# Patient Record
Sex: FEMALE | Race: WHITE | NOT HISPANIC OR LATINO | Employment: FULL TIME | URBAN - METROPOLITAN AREA
[De-identification: names, ages, dates, MRNs, and addresses within clinical notes are randomized per-mention and may not be internally consistent; named-entity substitution may affect disease eponyms.]

---

## 2017-06-02 ENCOUNTER — OFFICE VISIT (OUTPATIENT)
Dept: FAMILY MEDICINE | Facility: CLINIC | Age: 42
End: 2017-06-02
Payer: COMMERCIAL

## 2017-06-02 VITALS
BODY MASS INDEX: 18.19 KG/M2 | DIASTOLIC BLOOD PRESSURE: 63 MMHG | SYSTOLIC BLOOD PRESSURE: 99 MMHG | TEMPERATURE: 97.4 F | HEART RATE: 97 BPM | WEIGHT: 106 LBS | OXYGEN SATURATION: 100 %

## 2017-06-02 DIAGNOSIS — J01.90 ACUTE RHINOSINUSITIS: Primary | ICD-10-CM

## 2017-06-02 PROCEDURE — 99213 OFFICE O/P EST LOW 20 MIN: CPT | Performed by: PHYSICIAN ASSISTANT

## 2017-06-02 RX ORDER — FLUTICASONE PROPIONATE 50 MCG
2 SPRAY, SUSPENSION (ML) NASAL DAILY
Qty: 16 G | Refills: 0 | Status: SHIPPED | OUTPATIENT
Start: 2017-06-02 | End: 2017-10-04

## 2017-06-02 NOTE — PROGRESS NOTES
SUBJECTIVE:                                                    Dontae Weston is a 42 year old female who presents to clinic today for the following health issues:      ENT Symptoms             Symptoms: cc Present Absent Comment   Fever/Chills   x    Fatigue  x     Muscle Aches  x     Eye Irritation  x  Due to sinus pressure, no drainage, no erythema    Sneezing   x    Nasal Charbel/Drg  x     Sinus Pressure/Pain  x  Frontal and maxillary    Loss of smell  x     Dental pain  x  Maxillary teeth hurt from sinus pressure   Sore Throat   x    Swollen Glands   x    Ear Pain/Fullness   x    Cough  x  Productive with phlegm in the mornings   Wheeze   x    Chest Pain   x    Shortness of breath   x    Rash   x    Other         Symptom duration:  3 days ago   Symptom severity:  severe - worse today   Treatments tried:  otc, mucinex, benadryl, cough drops vicks   Contacts:  her daughter             Problem list and histories reviewed & adjusted, as indicated.  Additional history: as documented    Patient Active Problem List   Diagnosis     CARDIOVASCULAR SCREENING; LDL GOAL LESS THAN 160     Chemical dependency (H)     Past Surgical History:   Procedure Laterality Date     CRYOCAUTERY OF CERVIX  1997       Social History   Substance Use Topics     Smoking status: Current Some Day Smoker     Packs/day: 0.08     Types: Cigarettes     Smokeless tobacco: Never Used      Comment: 5 cigs daily     Alcohol use No     Family History   Problem Relation Age of Onset     Unknown/Adopted Maternal Grandmother      Alzheimer Disease Maternal Grandfather      HEART DISEASE Paternal Grandmother      CANCER Paternal Grandfather      brain tumor         Current Outpatient Prescriptions   Medication Sig Dispense Refill     fluticasone (FLONASE) 50 MCG/ACT spray Spray 2 sprays into both nostrils daily 16 g 0     No Known Allergies  BP Readings from Last 3 Encounters:   06/02/17 99/63   11/30/15 108/73   11/09/15 114/74    Wt Readings from Last  3 Encounters:   06/02/17 106 lb (48.1 kg)   11/30/15 105 lb 12.8 oz (48 kg)   11/09/15 107 lb (48.5 kg)                    Reviewed and updated as needed this visit by clinical staff  Tobacco  Allergies  Meds  Med Hx  Surg Hx  Fam Hx  Soc Hx      Reviewed and updated as needed this visit by Provider         ROS:  Constitutional, HEENT, cardiovascular, pulmonary, and integumentary systems are negative, except as otherwise noted.    OBJECTIVE:                                                    BP 99/63  Pulse 97  Temp 97.4  F (36.3  C) (Oral)  Wt 106 lb (48.1 kg)  SpO2 100%  BMI 18.19 kg/m2  Body mass index is 18.19 kg/(m^2).  GENERAL: healthy, alert and no distress  EYES: Eyes grossly normal to inspection  HENT: normal cephalic/atraumatic, ear canals and TM's normal, nose and mouth without ulcers or lesions, rhinorrhea clear and yellow, oropharynx clear, oral mucous membranes moist and sinuses: maxillary, frontal tenderness on both sides  NECK: no adenopathy, no asymmetry, masses, or scars and thyroid normal to palpation  RESP: lungs clear to auscultation - no rales, rhonchi or wheezes  CV: regular rate and rhythm, normal S1 S2, no S3 or S4, no murmur, click or rub, no peripheral edema and peripheral pulses strong  MS: no gross musculoskeletal defects noted, no edema  SKIN: no suspicious lesions or rashes    Diagnostic Test Results:  none      ASSESSMENT/PLAN:                                                        ICD-10-CM    1. Acute rhinosinusitis J01.90 fluticasone (FLONASE) 50 MCG/ACT spray       Patient Instructions   Rest and increase fluids.    Sleep with head of bed elevated at night. Have a humidifier running at night    Alternate motrin and tylenol as needed for fever and discomfort.   Motrin (ibuprofen) 200mg over the counter tablets - 3 tablets every 6 hours as needed.   Tylenol (acetaminophen) 325mg over the counter tablets - 2 tablets every 4-6 hours as needed.     Use the flonase as  prescribed.    Recommend using Afrin as directed over the counter for no more than 3 days. This will help to decrease nasal congestion.    Perform nasal saline rinses to help decrease nasal congestion or breath in steam multiple times daily.     May take an over the counter antihistamine (claritin or zyrtec) daily to help decrease production of nasal secretions.    Please call the clinic if your symptoms are not showing improvement by Tuesday. I will reassess your symptoms at that time and see if further treatment is necessary.     Return sooner if any worsening of symptoms.           Irasema Powell PA-C  [facility]

## 2017-06-02 NOTE — MR AVS SNAPSHOT
After Visit Summary   6/2/2017    Dontae Weston    MRN: [id number]           Patient Information     Date Of Birth          1975        Visit Information        Provider Department      6/2/2017 1:20 PM Irasema Powell PA-C [facility]        Today's Diagnoses     Acute rhinosinusitis    -  1      Care Instructions    Rest and increase fluids.    Sleep with head of bed elevated at night. Have a humidifier running at night    Alternate motrin and tylenol as needed for fever and discomfort.   Motrin (ibuprofen) 200mg over the counter tablets - 3 tablets every 6 hours as needed.   Tylenol (acetaminophen) 325mg over the counter tablets - 2 tablets every 4-6 hours as needed.     Use the flonase as prescribed.    Recommend using Afrin as directed over the counter for no more than 3 days. This will help to decrease nasal congestion.    Perform nasal saline rinses to help decrease nasal congestion or breath in steam multiple times daily.     May take an over the counter antihistamine (claritin or zyrtec) daily to help decrease production of nasal secretions.    Please call the clinic if your symptoms are not showing improvement by Tuesday. I will reassess your symptoms at that time and see if further treatment is necessary.     Return sooner if any worsening of symptoms.               Follow-ups after your visit        Who to contact     If you have questions or need follow up information about today's clinic visit or your schedule please contact [facility] directly at [phone].  Normal or non-critical lab and imaging results will be communicated to you by MyChart, letter or phone within 4 business days after the clinic has received the results. If you do not hear from us within 7 days, please contact the clinic through MyChart or phone. If you have a critical or abnormal lab result, we will notify you by phone as soon as possible.  Submit refill requests through  [employer] or call your pharmacy and they will forward the refill request to us. Please allow 3 business days for your refill to be completed.          Additional Information About Your Visit        ybuyhart Information     [employer] gives you secure access to your electronic health record. If you see a primary care provider, you can also send messages to your care team and make appointments. If you have questions, please call your primary care clinic.  If you do not have a primary care provider, please call [phone] and they will assist you.        Care EveryWhere ID     This is your Care EveryWhere ID. This could be used by other organizations to access your [facility] medical records  [id number]        Your Vitals Were     Pulse Temperature Pulse Oximetry BMI (Body Mass Index)          97 97.4  F (36.3  C) (Oral) 100% 18.19 kg/m2         Blood Pressure from Last 3 Encounters:   06/02/17 99/63   11/30/15 108/73   11/09/15 114/74    Weight from Last 3 Encounters:   06/02/17 106 lb (48.1 kg)   11/30/15 105 lb 12.8 oz (48 kg)   11/09/15 107 lb (48.5 kg)              Today, you had the following     No orders found for display         Today's Medication Changes          These changes are accurate as of: 6/2/17  1:40 PM.  If you have any questions, ask your nurse or doctor.               Start taking these medicines.        Dose/Directions    fluticasone 50 MCG/ACT spray   Commonly known as:  FLONASE   Used for:  Acute rhinosinusitis   Started by:  Irasema Powell PA-C        Dose:  2 spray   Spray 2 sprays into both nostrils daily   Quantity:  16 g   Refills:  0         Stop taking these medicines if you haven't already. Please contact your care team if you have questions.     levonorgestrel-ethinyl estradiol 0.1-20 MG-MCG per tablet   Commonly known as:  LIZBET ENRIQUEZ LESSINA   Stopped by:  Irasema Powell PA-C                Where to get your medicines      These medications were sent to [facility] [place] - [address] [address], [address]  [address] [address], [address], [facility] [address]     Phone:  [phone]     fluticasone 50 MCG/ACT spray                Primary Care Provider Office Phone # Fax #    Junior Tk Crane [phone]       [facility] [address] [address] [address]        Thank you!     Thank you for choosing [facility]  for your care. Our goal is always to provide you with excellent care. Hearing back from our patients is one way we can continue to improve our services. Please take a few minutes to complete the written survey that you may receive in the mail after your visit with us. Thank you!             Your Updated Medication List - Protect others around you: Learn how to safely use, store and throw away your medicines at www.disposemymeds.org.          This list is accurate as of: 6/2/17  1:40 PM.  Always use your most recent med list.                   Brand Name Dispense Instructions for use    fluticasone 50 MCG/ACT spray    FLONASE    16 g    Spray 2 sprays into both nostrils daily

## 2017-06-02 NOTE — NURSING NOTE
"Chief Complaint   Patient presents with     Sinus Problem       Initial BP 99/63  Pulse 97  Temp 97.4  F (36.3  C) (Oral)  Wt 106 lb (48.1 kg)  SpO2 100%  BMI 18.19 kg/m2 Estimated body mass index is 18.19 kg/(m^2) as calculated from the following:    Height as of 11/30/15: 5' 4\" (1.626 m).    Weight as of this encounter: 106 lb (48.1 kg).  Medication Reconciliation: complete  "

## 2017-06-02 NOTE — PATIENT INSTRUCTIONS
Rest and increase fluids.    Sleep with head of bed elevated at night. Have a humidifier running at night    Alternate motrin and tylenol as needed for fever and discomfort.   Motrin (ibuprofen) 200mg over the counter tablets - 3 tablets every 6 hours as needed.   Tylenol (acetaminophen) 325mg over the counter tablets - 2 tablets every 4-6 hours as needed.     Use the flonase as prescribed.    Recommend using Afrin as directed over the counter for no more than 3 days. This will help to decrease nasal congestion.    Perform nasal saline rinses to help decrease nasal congestion or breath in steam multiple times daily.     May take an over the counter antihistamine (claritin or zyrtec) daily to help decrease production of nasal secretions.    Please call the clinic if your symptoms are not showing improvement by Tuesday. I will reassess your symptoms at that time and see if further treatment is necessary.     Return sooner if any worsening of symptoms.

## 2017-06-06 ENCOUNTER — TELEPHONE (OUTPATIENT)
Dept: FAMILY MEDICINE | Facility: CLINIC | Age: 42
End: 2017-06-06

## 2017-06-06 DIAGNOSIS — J01.90 ACUTE SINUSITIS, RECURRENCE NOT SPECIFIED, UNSPECIFIED LOCATION: Primary | ICD-10-CM

## 2017-06-06 NOTE — TELEPHONE ENCOUNTER
Calling to reports she is still having symptoms and not getting better. Sinus pain behind eyes, head and teeth. Very uncomfortable. Sinus drainage is green. Mucinex helps it drain a little. Compared to last week sinuses are draining but feels same pressure is not any better. Feels some relief during day when taking decongestent but feels horrible when it wears off. Went to get flonase and it cost too much and so is using the saline sprays. If antibiotic is ordered she says the z-pac has helped in the past and she would prefer that to being on antibiotic for several weeks. To provider to advise.  Lolly Padilla RN

## 2017-06-06 NOTE — TELEPHONE ENCOUNTER
Patient calling to let Irasema HERNDON know she is not getting any better from when she was in on Friday to see her. Please call to advise. Thank you

## 2017-06-07 RX ORDER — AZITHROMYCIN 250 MG/1
TABLET, FILM COATED ORAL
Qty: 6 TABLET | Refills: 0 | Status: SHIPPED | OUTPATIENT
Start: 2017-06-07 | End: 2017-10-04

## 2017-07-08 ENCOUNTER — HEALTH MAINTENANCE LETTER (OUTPATIENT)
Age: 42
End: 2017-07-08

## 2017-10-02 NOTE — PATIENT INSTRUCTIONS
Please call to schedule your fasting lab appointment to have your cholesterol checked and to be screened for diabetes.    Call your insurance to find out if screening mammograms are covered. If they are, please call the [facility] to schedule your mammogram.    Return in 1 year for your annual physical.     Preventive Health Recommendations  Female Ages 40 to 49    Yearly exam:     See your health care provider every year in order to  1. Review health changes.   2. Discuss preventive care.    3. Review your medicines if your doctor prescribed any.      Get a Pap test every three years (unless you have an abnormal result and your provider advises testing more often).      If you get Pap tests with HPV test, you only need to test every 5 years, unless you have an abnormal result. You do not need a Pap test if your uterus was removed (hysterectomy) and you have not had cancer.      You should be tested each year for STDs (sexually transmitted diseases), if you're at risk.       Ask your doctor if you should have a mammogram.      Have a colonoscopy (test for colon cancer) if someone in your family has had colon cancer or polyps before age 50.       Have a cholesterol test every 5 years.       Have a diabetes test (fasting glucose) after age 45. If you are at risk for diabetes, you should have this test every 3 years.    Shots: Get a flu shot each year. Get a tetanus shot every 10 years.     Nutrition:     Eat at least 5 servings of fruits and vegetables each day.    Eat whole-grain bread, whole-wheat pasta and brown rice instead of white grains and rice.    Talk to your provider about Calcium and Vitamin D.     Lifestyle    Exercise at least 150 minutes a week (an average of 30 minutes a day, 5 days a week). This will help you control your weight and prevent disease.    Limit alcohol to one drink per day.    No smoking.     Wear sunscreen to prevent skin cancer.    See your dentist every six months for an exam and  cleaning.

## 2017-10-04 ENCOUNTER — OFFICE VISIT (OUTPATIENT)
Dept: FAMILY MEDICINE | Facility: CLINIC | Age: 42
End: 2017-10-04
Payer: COMMERCIAL

## 2017-10-04 VITALS
OXYGEN SATURATION: 100 % | WEIGHT: 106 LBS | SYSTOLIC BLOOD PRESSURE: 107 MMHG | BODY MASS INDEX: 18.78 KG/M2 | HEART RATE: 86 BPM | TEMPERATURE: 97.5 F | HEIGHT: 63 IN | DIASTOLIC BLOOD PRESSURE: 70 MMHG

## 2017-10-04 DIAGNOSIS — Z23 NEED FOR VACCINATION: ICD-10-CM

## 2017-10-04 DIAGNOSIS — Z13.1 SCREENING FOR DIABETES MELLITUS: ICD-10-CM

## 2017-10-04 DIAGNOSIS — Z13.6 CARDIOVASCULAR SCREENING; LDL GOAL LESS THAN 160: ICD-10-CM

## 2017-10-04 DIAGNOSIS — Z00.00 ENCOUNTER FOR ROUTINE ADULT HEALTH EXAMINATION WITHOUT ABNORMAL FINDINGS: Primary | ICD-10-CM

## 2017-10-04 DIAGNOSIS — Z12.31 ENCOUNTER FOR SCREENING MAMMOGRAM FOR BREAST CANCER: ICD-10-CM

## 2017-10-04 DIAGNOSIS — Z12.4 PAP SMEAR FOR CERVICAL CANCER SCREENING: ICD-10-CM

## 2017-10-04 PROCEDURE — 99396 PREV VISIT EST AGE 40-64: CPT | Mod: 25 | Performed by: PHYSICIAN ASSISTANT

## 2017-10-04 PROCEDURE — 90715 TDAP VACCINE 7 YRS/> IM: CPT | Performed by: PHYSICIAN ASSISTANT

## 2017-10-04 PROCEDURE — 90471 IMMUNIZATION ADMIN: CPT | Performed by: PHYSICIAN ASSISTANT

## 2017-10-04 PROCEDURE — G0145 SCR C/V CYTO,THINLAYER,RESCR: HCPCS | Performed by: PHYSICIAN ASSISTANT

## 2017-10-04 PROCEDURE — 87624 HPV HI-RISK TYP POOLED RSLT: CPT | Performed by: PHYSICIAN ASSISTANT

## 2017-10-04 NOTE — NURSING NOTE
"Chief Complaint   Patient presents with     Physical       Initial /70  Pulse 86  Temp 97.5  F (36.4  C) (Oral)  Ht 5' 3\" (1.6 m)  Wt 106 lb (48.1 kg)  SpO2 100%  BMI 18.78 kg/m2 Estimated body mass index is 18.78 kg/(m^2) as calculated from the following:    Height as of this encounter: 5' 3\" (1.6 m).    Weight as of this encounter: 106 lb (48.1 kg).  Medication Reconciliation: complete  Angelina Gallardo M.A.    "

## 2017-10-04 NOTE — PROGRESS NOTES
SUBJECTIVE:   CC: Dontae Weston is an 42 year old woman who presents for preventive health visit.     Physical   Annual:     Getting at least 3 servings of Calcium per day::  Yes    Bi-annual eye exam::  NO    Dental care twice a year::  Yes    Sleep apnea or symptoms of sleep apnea::  None    Frequency of exercise::  2-3 days/week    Duration of exercise::  30-45 minutes    Taking medications regularly::  Not Applicable    Medication side effects::  Not applicable    Additional concerns today::  No    She will have her tdap today.     Answers for HPI/ROS submitted by the patient on 10/4/2017   PHQ-2 Score: 0        Today's PHQ-2 Score: PHQ-2 ( 1999 [employer]) 10/4/2017   Q1: Little interest or pleasure in doing things 0   Q2: Feeling down, depressed or hopeless 0   PHQ-2 Score 0   Q1: Little interest or pleasure in doing things Not at all   Q2: Feeling down, depressed or hopeless Not at all   PHQ-2 Score 0       Abuse: Current or Past(Physical, Sexual or Emotional)- No  Do you feel safe in your environment - Yes    Social History   Substance Use Topics     Smoking status: Current Some Day Smoker     Packs/day: 0.08     Types: Cigarettes     Smokeless tobacco: Never Used      Comment: 5 cigs daily     Alcohol use No     The patient does not drink >3 drinks per day nor >7 drinks per week.    Reviewed orders with patient.  Reviewed health maintenance and updated orders accordingly - Yes  BP Readings from Last 3 Encounters:   10/04/17 107/70   06/02/17 99/63   11/30/15 108/73    Wt Readings from Last 3 Encounters:   10/04/17 106 lb (48.1 kg)   06/02/17 106 lb (48.1 kg)   11/30/15 105 lb 12.8 oz (48 kg)                  Patient Active Problem List   Diagnosis     CARDIOVASCULAR SCREENING; LDL GOAL LESS THAN 160     Chemical dependency (H)     Past Surgical History:   Procedure Laterality Date     CRYOCAUTERY OF CERVIX  1997       Social History   Substance Use Topics     Smoking status: Current Some Day Smoker      "Packs/day: 0.08     Types: Cigarettes     Smokeless tobacco: Never Used      Comment: 5 cigs daily     Alcohol use No     Family History   Problem Relation Age of Onset     Unknown/Adopted Maternal Grandmother      Alzheimer Disease Maternal Grandfather      HEART DISEASE Paternal Grandmother      CANCER Paternal Grandfather      brain tumor         No current outpatient prescriptions on file.     No Known Allergies    Patient under age 50, mutual decision reflected in health maintenance.        Pertinent mammograms are reviewed under the imaging tab.  History of abnormal Pap smear: NO - age 30- 65 PAP every 5 years with HPV co-testing recommended    Reviewed and updated as needed this visit by clinical staff  Tobacco  Allergies  Meds  Med Hx  Surg Hx  Fam Hx  Soc Hx        Reviewed and updated as needed this visit by Provider              ROS:  C: NEGATIVE for fever, chills, change in weight  I: NEGATIVE for worrisome rashes, moles or lesions  E: NEGATIVE for vision changes or irritation  ENT: NEGATIVE for ear, mouth and throat problems  R: NEGATIVE for significant cough or SOB  B: NEGATIVE for masses, tenderness or discharge  CV: NEGATIVE for chest pain, palpitations or peripheral edema  GI: NEGATIVE for nausea, abdominal pain, heartburn, or change in bowel habits  : NEGATIVE for unusual urinary or vaginal symptoms. Periods are regular.  M: NEGATIVE for significant arthralgias or myalgia  N: NEGATIVE for weakness, dizziness or paresthesias  P: NEGATIVE for changes in mood or affect     OBJECTIVE:   /70  Pulse 86  Temp 97.5  F (36.4  C) (Oral)  Ht 5' 3\" (1.6 m)  Wt 106 lb (48.1 kg)  SpO2 100%  BMI 18.78 kg/m2  EXAM:  GENERAL: healthy, alert and no distress  EYES: Eyes grossly normal to inspection, PERRL and conjunctivae and sclerae normal  HENT: ear canals and TM's normal, nose and mouth without ulcers or lesions  NECK: no adenopathy, no asymmetry, masses, or scars and thyroid normal to " palpation  RESP: lungs clear to auscultation - no rales, rhonchi or wheezes  BREAST: normal without masses, tenderness or nipple discharge and no palpable axillary masses or adenopathy  CV: regular rate and rhythm, normal S1 S2, no S3 or S4, no murmur, click or rub, no peripheral edema and peripheral pulses strong  ABDOMEN: soft, nontender, no hepatosplenomegaly, no masses and bowel sounds normal   (female): normal female external genitalia, normal urethral meatus , vaginal mucosa pink, moist, well rugated and normal cervix, adnexae, and uterus without masses.  MS: no gross musculoskeletal defects noted, no edema  SKIN: no suspicious lesions or rashes  NEURO: Normal strength and tone, mentation intact and speech normal  PSYCH: mentation appears normal, affect normal/bright    ASSESSMENT/PLAN:       ICD-10-CM    1. Encounter for routine adult health examination without abnormal findings Z00.00    2. CARDIOVASCULAR SCREENING; LDL GOAL LESS THAN 160 Z13.6 Lipid panel reflex to direct LDL   3. Screening for diabetes mellitus Z13.1 Glucose   4. Pap smear for cervical cancer screening Z12.4 Pap imaged thin layer screen with HPV - recommended age 30 - 65 years (select HPV order below)     HPV High Risk Types DNA Cervical   5. Encounter for screening mammogram for breast cancer Z12.31 *MA Screening Digital Bilateral   6. Need for vaccination Z23 TDAP VACCINE ([facility]ACEL) [55087.002]     1st  Administration  [07561]       COUNSELING:  Reviewed preventive health counseling, as reflected in patient instructions       Regular exercise       Healthy diet/nutrition       Immunizations    Vaccinated for: TDAP               reports that she has been smoking Cigarettes.  She has been smoking about 0.08 packs per day. She has never used smokeless tobacco.  Tobacco Cessation Action Plan: Information offered: Patient not interested at this time  Estimated body mass index is 18.78 kg/(m^2) as calculated from the following:    Height as of  "this encounter: 5' 3\" (1.6 m).    Weight as of this encounter: 106 lb (48.1 kg).         Counseling Resources:  ATP IV Guidelines  Pooled Cohorts Equation Calculator  Breast Cancer Risk Calculator  FRAX Risk Assessment  ICSI Preventive Guidelines  Dietary Guidelines for Americans, 2010  USDA's MyPlate  ASA Prophylaxis  Lung CA Screening    Irasema Powell PA-C  [facility]  "

## 2017-10-04 NOTE — MR AVS SNAPSHOT
After Visit Summary   10/4/2017    Dontae Weston    MRN: [id number]           Patient Information     Date Of Birth          1975        Visit Information        Provider Department      10/4/2017 4:00 PM Irasema Powell PA-C [facility]        Today's Diagnoses     Encounter for routine adult health examination without abnormal findings    -  1    CARDIOVASCULAR SCREENING; LDL GOAL LESS THAN 160        Screening for diabetes mellitus        Pap smear for cervical cancer screening        Encounter for screening mammogram for breast cancer          Care Instructions    Please call to schedule your fasting lab appointment to have your cholesterol checked and to be screened for diabetes.    Call your insurance to find out if screening mammograms are covered. If they are, please call the [facility] to schedule your mammogram.    Return in 1 year for your annual physical.     Preventive Health Recommendations  Female Ages 40 to 49    Yearly exam:     See your health care provider every year in order to  1. Review health changes.   2. Discuss preventive care.    3. Review your medicines if your doctor prescribed any.      Get a Pap test every three years (unless you have an abnormal result and your provider advises testing more often).      If you get Pap tests with HPV test, you only need to test every 5 years, unless you have an abnormal result. You do not need a Pap test if your uterus was removed (hysterectomy) and you have not had cancer.      You should be tested each year for STDs (sexually transmitted diseases), if you're at risk.       Ask your doctor if you should have a mammogram.      Have a colonoscopy (test for colon cancer) if someone in your family has had colon cancer or polyps before age 50.       Have a cholesterol test every 5 years.       Have a diabetes test (fasting glucose) after age 45. If you are at risk for diabetes, you should have this test every  3 years.    Shots: Get a flu shot each year. Get a tetanus shot every 10 years.     Nutrition:     Eat at least 5 servings of fruits and vegetables each day.    Eat whole-grain bread, whole-wheat pasta and brown rice instead of white grains and rice.    Talk to your provider about Calcium and Vitamin D.     Lifestyle    Exercise at least 150 minutes a week (an average of 30 minutes a day, 5 days a week). This will help you control your weight and prevent disease.    Limit alcohol to one drink per day.    No smoking.     Wear sunscreen to prevent skin cancer.    See your dentist every six months for an exam and cleaning.          Follow-ups after your visit        Future tests that were ordered for you today     Open Future Orders        Priority Expected Expires Ordered    Lipid panel reflex to direct LDL Routine  4/7/2018 10/4/2017    Glucose Routine  10/4/2018 10/4/2017    *MA Screening Digital Bilateral Routine  10/4/2018 10/4/2017            Who to contact     If you have questions or need follow up information about today's clinic visit or your schedule please contact [facility] directly at [phone].  Normal or non-critical lab and imaging results will be communicated to you by J C Ladshart, letter or phone within 4 business days after the clinic has received the results. If you do not hear from us within 7 days, please contact the clinic through DDRdrivet or phone. If you have a critical or abnormal lab result, we will notify you by phone as soon as possible.  Submit refill requests through Phorest or call your pharmacy and they will forward the refill request to us. Please allow 3 business days for your refill to be completed.          Additional Information About Your Visit        Phorest Information     Phorest gives you secure access to your electronic health record. If you see a primary care provider, you can also send messages to your care team and make appointments. If you have questions,  "please call your primary care clinic.  If you do not have a primary care provider, please call [phone] and they will assist you.        Care EveryWhere ID     This is your Care EveryWhere ID. This could be used by other organizations to access your [place] medical records  [id number]        Your Vitals Were     Pulse Temperature Height Pulse Oximetry BMI (Body Mass Index)       86 97.5  F (36.4  C) (Oral) 5' 3\" (1.6 m) 100% 18.78 kg/m2        Blood Pressure from Last 3 Encounters:   10/04/17 107/70   06/02/17 99/63   11/30/15 108/73    Weight from Last 3 Encounters:   10/04/17 106 lb (48.1 kg)   06/02/17 106 lb (48.1 kg)   11/30/15 105 lb 12.8 oz (48 kg)              We Performed the Following     HPV High Risk Types DNA Cervical     Pap imaged thin layer screen with HPV - recommended age 30 - 65 years (select HPV order below)        Primary Care Provider Office Phone # Fax #    Junior Crane [phone] [address] [facility] [address]        Equal Access to Services     [facility] JACIDNA : Hadii aad ku hadasho Soomaali, waaxda luqadaha, qaybta kaalmada adeegyada, axel cardozan elias anthony. So [facility] [phone].    ATENCIÓN: Si habla español, tiene a kay disposición servicios gratuitos de asistencia lingüística. Llame al [phone].    We comply with applicable federal civil rights laws and [place] laws. We do not discriminate on the basis of race, color, national origin, age, disability, sex, sexual orientation, or gender identity.            Thank you!     Thank you for choosing [facility]  for your care. Our goal is always to provide you with excellent care. Hearing back from our patients is one way we can continue to improve our services. Please take a few minutes to complete the written survey that you may receive in the mail after your visit with us. Thank you!             Your Updated Medication List - Protect others around you: Learn " how to safely use, store and throw away your medicines at www.disposemymeds.org.      Notice  As of 10/4/2017  4:19 PM    You have not been prescribed any medications.

## 2017-10-04 NOTE — LETTER
October 12, 2017    Dontae Weston  [address] [place] [id number]    Dear Dontae,  We are happy to inform you that your PAP smear result from 10/4/17 is normal.  We are now able to do a follow up test on PAP smears. The DNA test is for HPV (Human Papilloma Virus). Cervical cancer is closely linked with certain types of HPV. Your result showed no evidence of HPV.  Therefore we recommend you return in 5 years for your next pap smear and HPV test.  You will still need to return to the clinic every year for an annual exam and other preventive tests.  Please contact the clinic at [phone] with any questions.  Sincerely,    Irasema Powell PA-C/anjel

## 2017-10-06 LAB
COPATH REPORT: NORMAL
PAP: NORMAL

## 2017-10-11 LAB
FINAL DIAGNOSIS: NORMAL
HPV HR 12 DNA CVX QL NAA+PROBE: NEGATIVE
HPV16 DNA SPEC QL NAA+PROBE: NEGATIVE
HPV18 DNA SPEC QL NAA+PROBE: NEGATIVE
SPECIMEN DESCRIPTION: NORMAL

## 2017-10-27 ENCOUNTER — TELEPHONE (OUTPATIENT)
Dept: FAMILY MEDICINE | Facility: CLINIC | Age: 42
End: 2017-10-27

## 2017-10-27 NOTE — TELEPHONE ENCOUNTER
Patient is calling stating needs a call back in regards to FMLA. Please call to discuss. Thank  You.

## 2017-11-01 NOTE — TELEPHONE ENCOUNTER
"Patient's daughter was ill last Tuesday and she had to miss work and her employer terminated her position the next day, was given a call and told not to come to work Wednesday, her boss said \"I need to think about this\" Patient states she was fired.    Patient is looking to have a note written to excuse her from work last Tuesday, states she has pictures of her daughter vomiting, unfortunately.     Patient did hire an . Please advise. Are you willing to see patient to see if there is anything you can do to help?    Patient last saw you on 11/30/16, most recently saw Irasema Powell on 10/4/17  "

## 2018-06-07 ENCOUNTER — OFFICE VISIT (OUTPATIENT)
Dept: FAMILY MEDICINE | Facility: CLINIC | Age: 43
End: 2018-06-07
Payer: COMMERCIAL

## 2018-06-07 VITALS
TEMPERATURE: 96.9 F | RESPIRATION RATE: 18 BRPM | HEART RATE: 94 BPM | WEIGHT: 104 LBS | DIASTOLIC BLOOD PRESSURE: 87 MMHG | BODY MASS INDEX: 18.42 KG/M2 | SYSTOLIC BLOOD PRESSURE: 130 MMHG | OXYGEN SATURATION: 99 %

## 2018-06-07 DIAGNOSIS — R10.0 ACUTE ABDOMEN: ICD-10-CM

## 2018-06-07 DIAGNOSIS — F19.20 CHEMICAL DEPENDENCY (H): Primary | ICD-10-CM

## 2018-06-07 PROCEDURE — 99214 OFFICE O/P EST MOD 30 MIN: CPT | Performed by: FAMILY MEDICINE

## 2018-06-07 ASSESSMENT — PAIN SCALES - GENERAL: PAINLEVEL: SEVERE PAIN (6)

## 2018-06-07 NOTE — PROGRESS NOTES
SUBJECTIVE:  43 year old.The patient has a history of abdominal pain and shoulder pain.  This started one day ago. Location right upper quadrant pain and left shoulder quality steady and increasing Associated symptoms are vomiting and diarrhea for 1-2 weeks.  Patient is a past alcoholic who is drinking again .  . ROS chills, no fever      Reviewed health maintenance  Patient Active Problem List   Diagnosis     CARDIOVASCULAR SCREENING; LDL GOAL LESS THAN 160     Chemical dependency (H)     Past Medical History:   Diagnosis Date     Abdominal pain, unspecified site      Chemical dependency (H)     alcohol; treated and sober since 2002     Dysplasia of cervix 1997    CRYO SURG     Panic attack        OBJECTIVE:  no apparent distress  /87  Pulse 94  Temp 96.9  F (36.1  C) (Oral)  Resp 18  Wt 104 lb (47.2 kg)  SpO2 99%  BMI 18.42 kg/m2    LUNGS:  CTA B/L, no wheezing or crackles.   Cardiovascular: negative, PMI normal. No lifts, heaves, or thrills. RRR. No murmurs, clicks gallops or rub   Gastrointestinal: Abdomen soft, non-tender. BS normal. No masses, organomegaly, positive findings: rigid abdomen with shoulder strap pain worse with palpation. positive rebound         ICD-10-CM    1. Chemical dependency (H) F19.20    2. Acute abdomen R10.0     PLAN: refuses ambulance  She is with sister and she will take her without stopping to [facility] ED.  Sister understand the severity of the problem.

## 2018-06-07 NOTE — MR AVS SNAPSHOT
After Visit Summary   6/7/2018    Dontae Weston    MRN: [id number]           Patient Information     Date Of Birth          1975        Visit Information        Provider Department      6/7/2018 10:15 AM Junior Crane MD [facility]        Today's Diagnoses     Chemical dependency (H)    -  1    Acute abdomen           Follow-ups after your visit        Who to contact     If you have questions or need follow up information about today's clinic visit or your schedule please contact [facility] directly at [phone].  Normal or non-critical lab and imaging results will be communicated to you by Cooliohart, letter or phone within 4 business days after the clinic has received the results. If you do not hear from us within 7 days, please contact the clinic through Zaelabt or phone. If you have a critical or abnormal lab result, we will notify you by phone as soon as possible.  Submit refill requests through Domain Media or call your pharmacy and they will forward the refill request to us. Please allow 3 business days for your refill to be completed.          Additional Information About Your Visit        MyChart Information     Domain Media gives you secure access to your electronic health record. If you see a primary care provider, you can also send messages to your care team and make appointments. If you have questions, please call your primary care clinic.  If you do not have a primary care provider, please call [phone] and they will assist you.        Care EveryWhere ID     This is your Care EveryWhere ID. This could be used by other organizations to access your [place] medical records  [id number]        Your Vitals Were     Pulse Temperature Respirations Pulse Oximetry BMI (Body Mass Index)       94 96.9  F (36.1  C) (Oral) 18 99% 18.42 kg/m2        Blood Pressure from Last 3 Encounters:   06/07/18 130/87   10/04/17 107/70   06/02/17 99/63    Weight from Last 3  Encounters:   06/07/18 104 lb (47.2 kg)   10/04/17 106 lb (48.1 kg)   06/02/17 106 lb (48.1 kg)              Today, you had the following     No orders found for display       Primary Care Provider Office Phone # Fax #    Junior Crane [phone] [address] [facility] [address]        Equal Access to Services     MARNI MONACO : Hadii aad ku hadasho Soomaali, waaxda luqadaha, qaybta kaalmada adeegyada, waxay idiin hayaan adeeg kharash lacorey . So [facility] [phone].    ATENCIÓN: Si habla español, tiene a kay disposición servicios gratuitos de asistencia lingüística. Llame al [phone].    We comply with applicable federal civil rights laws and [place] laws. We do not discriminate on the basis of race, color, national origin, age, disability, sex, sexual orientation, or gender identity.            Thank you!     Thank you for choosing [facility]  for your care. Our goal is always to provide you with excellent care. Hearing back from our patients is one way we can continue to improve our services. Please take a few minutes to complete the written survey that you may receive in the mail after your visit with us. Thank you!             Your Updated Medication List - Protect others around you: Learn how to safely use, store and throw away your medicines at www.disposemymeds.org.      Notice  As of 6/7/2018 10:49 AM    You have not been prescribed any medications.

## 2018-06-07 NOTE — NURSING NOTE
"Chief Complaint   Patient presents with     Shoulder left     x 1 week no know injury - right side pain x 1 day       Initial /87  Pulse 94  Temp 96.9  F (36.1  C) (Oral)  Resp 18  Wt 104 lb (47.2 kg)  SpO2 99%  BMI 18.42 kg/m2 Estimated body mass index is 18.42 kg/(m^2) as calculated from the following:    Height as of 10/4/17: 5' 3\" (1.6 m).    Weight as of this encounter: 104 lb (47.2 kg).  Medication Reconciliation: complete  Angelina Gallardo M.A.    "

## 2018-09-11 ENCOUNTER — TELEPHONE (OUTPATIENT)
Dept: FAMILY MEDICINE | Facility: CLINIC | Age: 43
End: 2018-09-11

## 2018-09-11 NOTE — TELEPHONE ENCOUNTER
Patient is calling asking for provider to call patient back to speak about a personal matter. Patient is not willing to share her personal issues with .  Please call to advise  Thank you

## 2018-09-11 NOTE — TELEPHONE ENCOUNTER
Patient reports has questions for provider.  Wants to know if there is any medication to stop drinking. She lost her job and did not handle it the appropriate way per aptient. Patient  states she has a physical addiction to alcohol concerned about the withdrawal part.  Patient is interested in the drug acamprosate ( per patient) can't handle handle withdrawal sx of racing heart, sweaty and shaky needs to drink every 3-4 hours. Patient is not seeking care to stop does have a plan to go to counseling and has reached out.     I informed the patient that the best route is to make an appointment with Dr. Crane to discuss this medication due to the other items we may have to give or do for her.  I made an appointment for 9/12/18 @ 9:00 am with Dr. Crane to address this issue.  I ask that she not drink when she has to drive to this appointment and she stated she had to just to get back to steve or baseline but is calling her mom right now to arrange a ride.  She still wants to this message sen to Royce Brian.  I informed her not be surprised if he has the same answer as I gave her and to please keep tomorrows appointment if she does not hear from her.  Patient's speech was slurred and slow. Manisha Braun R.N.      To Dr. Crane

## 2018-09-18 ENCOUNTER — TELEPHONE (OUTPATIENT)
Dept: FAMILY MEDICINE | Facility: CLINIC | Age: 43
End: 2018-09-18

## 2018-09-18 NOTE — TELEPHONE ENCOUNTER
Patient is calling would like a call back in regards to personal matter, patient did not want to disclose. Please call to discuss. Thank you.

## 2018-09-18 NOTE — TELEPHONE ENCOUNTER
Patient wanting to know if Dr. Junior Crane will prescribe the med she had asked for last week (see 9/11 telephone encounter)  She states had to cancel the appointment.  She reports had no transportation.  I spoke with Dr. Junior Crane.  He will not prescribe the acamprosate;  He states he is not familiar with it and she needs to be detoxed at a facility.  Patient states that she is still drinking (reports has only been having beer)  Will experience withdrawal symptoms so drinks every 4 hrs.  She reports does not drive when has been drinking.  I did advise her to go to the ED (have someone else drive her) now for detox.  She states that she called a detox center in [place] last night.  They didn't have a bed open but told her to call this a.m.  Still no bed;  She is now on a waiting list and was instructed to call again after 3pm.  She states that her plan is to continue to call and check on bed availability.  She states the person there had told her that if the withdrawal symptoms were increasing to go to ED.  She states that that won't happen because she is drinking every 4 hrs to prevent them from occurring.  She refuses to go to ED now.  Does not want to speak with our behavioral health clinician or a care coordinator.  She states that her plan is to continue to call the detox facility in [place] until they are able to get her a bed.  She states that she has been going through this long term and is not a danger to self/others.  States awareness that the alcohol is controlling her life and she needs to get her life back.  Assures me she will go to ED if symptoms worsen;  Will call if needing help from us.  Dr. Junior Crane aware.  Lisa Coleman RN

## 2019-10-02 ENCOUNTER — OFFICE VISIT (OUTPATIENT)
Dept: FAMILY MEDICINE | Facility: CLINIC | Age: 44
End: 2019-10-02
Payer: COMMERCIAL

## 2019-10-02 VITALS
OXYGEN SATURATION: 100 % | HEART RATE: 72 BPM | SYSTOLIC BLOOD PRESSURE: 133 MMHG | TEMPERATURE: 98.1 F | DIASTOLIC BLOOD PRESSURE: 87 MMHG | HEIGHT: 64 IN | BODY MASS INDEX: 18.61 KG/M2 | WEIGHT: 109 LBS

## 2019-10-02 DIAGNOSIS — L98.9 SKIN LESION: Primary | ICD-10-CM

## 2019-10-02 PROCEDURE — 99213 OFFICE O/P EST LOW 20 MIN: CPT | Performed by: FAMILY MEDICINE

## 2019-10-02 ASSESSMENT — MIFFLIN-ST. JEOR: SCORE: 1139.42

## 2019-10-02 NOTE — PROGRESS NOTES
"Chief complaint: bump on chest    Denies any possibility of pregnancy declined a pregnancy test    A year ago had noticed this bump  Sought consult - was advised derm lost to follow up  It had gotten bigger past couple of months         Description:   Location: left anterior chest  Character or description: bump  Itching (Pruritis): no     Progression of Symptoms:  worsening    Accompanying Signs & Symptoms:  Fever: no   Body aches or joint pain: no   Sore throat symptoms: no   Recent cold symptoms: no    History:   Previous similar rash: no     Precipitating factors:   Exposure to similar rash: no   New exposures: None   Recent travel: no     Alleviating factors:   none     Therapies Tried and outcome: none      Problem list, Medication list, Allergies, and Medical/Social/Surgical histories reviewed in EPIC and updated as appropriate.    ROS:  Constitutional, HEENT, cardiovascular, pulmonary, gi and gu systems are negative, except as otherwise noted.    OBJECTIVE:                                                    /87   Pulse 72   Temp 98.1  F (36.7  C) (Oral)   Ht 1.626 m (5' 4\")   Wt 49.4 kg (109 lb)   SpO2 100%   Breastfeeding? No   BMI 18.71 kg/m    Body mass index is 18.71 kg/m .  GENERAL: healthy, alert and no distress  Neurologic: Cranial nerves intact no gross neurological deficits  Psych: appropriate mood and affect  MS: no gross musculoskeletal defects noted, no edema  Skin: small skin colored papule about 5mm with erythematous borders and a pearly appearance/substance left anterior chest wall    Diagnostic Test Results:  none      ASSESSMENT/PLAN:                                                    No diagnosis found.      ICD-10-CM    1. Skin lesion L98.9 DERMATOLOGY REFERRAL     Recommend derm evaluation for possible skin biopsy -   Recommend being seen within 2-3 weeks  Differentials discussed. Patient voiced understanding    Chiqui Bender MD  [facility]      "

## 2019-12-16 ENCOUNTER — HEALTH MAINTENANCE LETTER (OUTPATIENT)
Age: 44
End: 2019-12-16

## 2020-04-08 ENCOUNTER — NURSE TRIAGE (OUTPATIENT)
Dept: NURSING | Facility: CLINIC | Age: 45
End: 2020-04-08

## 2020-04-08 NOTE — TELEPHONE ENCOUNTER
Dontae is calling and feels that she has a tampon stuck.  Dontae is trying to get tampon out.  Denies fever and denies rash.      Additional Information    Negative: Shock suspected (e.g., cold/pale/clammy skin, too weak to stand, low BP, rapid pulse)    Negative: Sounds like a life-threatening emergency to the triager    Negative: FB is sharp    Negative: Bleeding from the vagina  [EXCEPTION: patient denies injury and knows that the blood is from menstrual period]    Negative: FB causes pain or discomfort    Negative: Unable to urinate (can't pass urine)    Negative: Possibility of sexual assault    Negative: [1] Retained tampon AND [2] fever    Negative: [1] Retained tampon AND [2] new rash    Negative: Patient sounds very sick or weak to the triager    Negative: [1] Vaginal FB AND [2] can't remove at home    Negative: [1] Vaginal FB removed AND [2] unusual vaginal discharge or bad odor occurs    [1] Retained tampon in vagina AND [2] questions about how to remove it    Protocols used: VAGINAL FOREIGN BODY-A-AH

## 2020-04-30 ENCOUNTER — NURSE TRIAGE (OUTPATIENT)
Dept: NURSING | Facility: CLINIC | Age: 45
End: 2020-04-30

## 2020-04-30 ENCOUNTER — TELEPHONE (OUTPATIENT)
Dept: FAMILY MEDICINE | Facility: CLINIC | Age: 45
End: 2020-04-30

## 2020-04-30 NOTE — TELEPHONE ENCOUNTER
Dr. Crane not doing appointment's right now, he is on furlough.  He can do an evisit if pt would like or we can schedule her with someone else.    Left message on answering machine for patient to call back to [phone].  Raisa YODERN, RN

## 2020-04-30 NOTE — TELEPHONE ENCOUNTER
Pt notified Dr. Crane only doing e visits no other kind of visit now.  Offered to schedule her an appointment with one of his partners either virtual or in clinic.  She states she will not come into clinic and declines virtual visit with one of his partners. Pt unsure how to get on her mychart.  mychart help number given, advised they can help her get into her mychart and start an e visit.  Advised if she changes her mind to call back for an appointment. Pt verbalized understanding.  Raisa YODERN, RN

## 2020-04-30 NOTE — TELEPHONE ENCOUNTER
Caller is complaining of weakness and fatigue. Caller states she thinks she has cancer,and before covid started, she had a mass on her chest that was supposed to be followed up on. Caller denies any shortness of breath or fever. Caller states she wants to speak with Dr. Crane only. Triage guidelines recommend to see pcp within 24 hours. Caller verbalized and understands directives.  COVID 19 Nurse Triage Plan/Patient Instructions    Please be aware that novel coronavirus (COVID-19) may be circulating in the community. If you develop symptoms such as fever, cough, or SOB or if you have concerns about the presence of another infection including coronavirus (COVID-19), please contact your health care provider or visit [email or web address].     Disposition/Instructions    Patient to have scheduled Telephone Visit with a provider. Follow System Ambulatory Workflow for COVID 19.     The clinic staff will assist you to schedule an appointment to complete the Telephone Visit with a provider during normal clinic hours.       Call Back If: Your symptoms worsen before you are able to complete your Telephone Visit with a provider.    Thank you for limiting contact with others, wearing a simple mask to cover your cough, practice good hand hygiene habits and accessing our virtual services where possible to limit the spread of this virus.    For more information about COVID19 and options for caring for yourself at home, please visit the CDC website at https://www.cdc.gov/coronavirus/2019-ncov/about/steps-when-sick.html  For more options for care at [facility], please visit our website at [email or web address]    For more information, please use the [place] Department of Health COVID-19 Website: https://www.health.state.[place]/diseases/coronavirus/index.html  [place] Department [place] Health ([facility]) COVID-19 Hotlines (Interpreters available):      Health questions: Phone Number: [phone] or  [phone] and Hours: 7 a.m. to 7 p.m.    Schools and  questions: Phone Number: [phone] or [phone] and Hours 7 a.m. to 7 p.m.                  Reason for Disposition    [1] MODERATE weakness (i.e., interferes with work, school, normal activities) AND [2] persists > 3 days    Additional Information    Negative: SEVERE difficulty breathing (e.g., struggling for each breath, speaks in single words)    Negative: Difficult to awaken or acting confused (e.g., disoriented, slurred speech)    Negative: Bluish (or gray) lips or face now    Negative: Shock suspected (e.g., cold/pale/clammy skin, too weak to stand, low BP, rapid pulse)    Negative: Sounds like a life-threatening emergency to the triager    Negative: [1] COVID-19 suspected (e.g., cough, fever, shortness of breath) AND [2] public health department recommends testing    Negative: [1] COVID-19 exposure AND [2] no symptoms    Negative: COVID-19 and Breastfeeding, questions about    Negative: SEVERE or constant chest pain (Exception: mild central chest pain, present only when coughing)    Negative: MODERATE difficulty breathing (e.g., speaks in phrases, SOB even at rest, pulse 100-120)    Negative: Patient sounds very sick or weak to the triager    Negative: MILD difficulty breathing (e.g., minimal/no SOB at rest, SOB with walking, pulse <100)    Negative: Chest pain    Negative: Fever > 103 F (39.4 C)    Negative: [1] Fever > 101 F (38.3 C) AND [2] age > 60    Negative: [1] Fever > 100.0 F (37.8 C) AND [2] bedridden (e.g., nursing home patient, CVA, chronic illness, recovering from surgery)    Negative: HIGH RISK patient (e.g., age > 64 years, diabetes, heart or lung disease, weak immune system)    Negative: Fever present > 3 days (72 hours)    Negative: [1] Fever returns after gone for over 24 hours AND [2] symptoms worse or not improved    Negative: [1] Continuous (nonstop) coughing interferes with work or school AND [2] no improvement  "using cough treatment per protocol    Negative: Cough present > 3 weeks    Negative: 1] COVID-19 infection diagnosed or suspected AND [2] mild symptoms (fever, cough) AND [2] no trouble breathing or other complications    Negative: COVID-19, questions about    Negative: COVID-19 Home Isolation, questions about    Negative: COVID-19 Prevention and Healthy Living, questions about    Negative: COVID-19 Testing, questions about    Negative: Severe difficulty breathing (e.g., struggling for each breath, speaks in single words)    Negative: Shock suspected (e.g., cold/pale/clammy skin, too weak to stand, low BP, rapid pulse)    Negative: Difficult to awaken or acting confused (e.g., disoriented, slurred speech)    Negative: [1] Fainted > 15 minutes ago AND [2] still feels too weak or dizzy to stand    Negative: [1] SEVERE weakness (i.e., unable to walk or barely able to walk, requires support) AND     [2] new onset or worsening    Negative: Sounds like a life-threatening emergency to the triager    Negative: Weakness of the face, arm or leg on one side of the body    Negative: [1] Has diabetes (diabetes mellitus) AND [2] weakness from low blood sugar (i.e., < 60 mg/dl or 3.5 mmol/l)    Negative: Heat exhaustion suspected (i.e., dehydration from heat exposure)    Negative: Vomiting is main symptom    Negative: Diarrhea is main symptom    Negative: Difficulty breathing    Negative: Heart beating < 50 beats per minute OR > 140 beats per minute    Negative: Extra heart beats OR irregular heart beating   (i.e., \"palpitations\")    Negative: Follows bleeding (e.g., from vomiting, rectum, vagina; EXCEPTION: small brief weakness from sight of a small amount blood)    Negative: Black or tarry bowel movements    Negative: [1] Drinking very little AND [2] dehydration suspected (e.g., no urine > 12 hours, very dry mouth, very lightheaded)    Negative: Patient sounds very sick or weak to the triager    Negative: [1] MODERATE weakness " (i.e., interferes with work, school, normal activities) AND [2] cause unknown  (Exceptions: weakness with acute minor illness, or weakness from poor fluid intake)    Negative: [1] MODERATE weakness AND [2] from poor fluid intake AND [3] no improvement after 2 hours of rest and fluids    Negative: [1] Fever > 103 F (39.4 C) AND [2] not able to get the fever down using Fever Care Advice    Negative: [1] Fever > 101 F (38.3 C) AND [2] age > 60    Negative: [1] Fever > 100.0 F (37.8 C) AND [2] bedridden (e.g., nursing home patient, CVA, chronic illness, recovering from surgery)    Negative: [1] Fever > 100.0 F (37.8 C) AND [2] diabetes mellitus or weak immune system (e.g., HIV positive, cancer chemo, splenectomy, chronic steroids)    Negative: Pale skin (pallor)    Protocols used: WEAKNESS (GENERALIZED) AND FATIGUE-A-AH, CORONAVIRUS (COVID-19) DIAGNOSED OR [id number] 3.30.20

## 2020-04-30 NOTE — TELEPHONE ENCOUNTER
Pt wants a telephone appt with Dr Crane, spoke to triage, she has generalized weakness, and worried she missed dermatology appt last October.  Please call her back.

## 2020-07-21 ENCOUNTER — TELEPHONE (OUTPATIENT)
Dept: FAMILY MEDICINE | Facility: CLINIC | Age: 45
End: 2020-07-21

## 2020-07-21 DIAGNOSIS — Z30.012: ICD-10-CM

## 2020-07-21 DIAGNOSIS — Z33.1 PREGNANCY, INCIDENTAL: Primary | ICD-10-CM

## 2020-07-21 RX ORDER — LEVONORGESTREL 1.5 MG/1
1.5 TABLET ORAL ONCE
Qty: 1 TABLET | Refills: 0 | Status: CANCELLED | OUTPATIENT
Start: 2020-07-21 | End: 2020-07-21

## 2020-07-21 NOTE — TELEPHONE ENCOUNTER
Per insurance, will cover if gets a prescription, otherwise, too expensive over the counter. Patient would like this as an prescription    Jennifer YODERN, RN, CPN

## 2020-07-21 NOTE — TELEPHONE ENCOUNTER
Per patient it has been 48 hours since intercourse.   Lab appointment scheduled today at 2:30    Jennifer YODERN, RN, CPN

## 2020-07-21 NOTE — TELEPHONE ENCOUNTER
Need diagnoses for need.  If for prevent pregnancy then must  be less than 72 hours and patient must have a negative pregnancy test at the clinic.  Order signed for pregnancy test.

## 2020-07-21 NOTE — TELEPHONE ENCOUNTER
Reason for Call:  Medication or medication refill:    Do you use a [facility]?  Name of the pharmacy and phone number for the current request:  [facility] [phone]    Name of the medication requested: Plan B    Other request: patient would like this sent to pharmacy     Can we leave a detailed message on this number? YES    Phone number patient can be reached at: Home number on file [phone] (home)    Best Time: any      Call taken on 7/21/2020 at 7:02 AM by Taty Pascal

## 2020-07-22 NOTE — TELEPHONE ENCOUNTER
Patient states she was unable to get to clinic yesterday. It has now been 72 hours, advised patient unable to prescribe prescription at this time due to no pregnancy test and over the 72 hours needed to be taken. Patient verbalized understanding    Jennifer YODERN, RN, CPN

## 2020-12-19 ENCOUNTER — NURSE TRIAGE (OUTPATIENT)
Dept: NURSING | Facility: CLINIC | Age: 45
End: 2020-12-19

## 2020-12-19 NOTE — TELEPHONE ENCOUNTER
Clinic Action Needed: Yes. Please call patient at [phone]    Reason for Call: Patient calling reporting having heavy vaginal bleeding with her menstrual cycle. States she has used half a box of tampons today. Soaked more than 2 tampons in one hour.    Patient recommendation/teaching: Per guideline, advised patient to be seen at the emergency department. Patient refused disposition and states this has been going on for few months     Routed to: Dr. Crane's Nurse Pool.     Rob Brito RN  [facility]     COVID 19 Nurse Triage Plan/Patient Instructions    Please be aware that novel coronavirus (COVID-19) may be circulating in the community. If you develop symptoms such as fever, cough, or SOB or if you have concerns about the presence of another infection including coronavirus (COVID-19), please contact your health care provider or visit [email or web address].     Disposition/Instructions    ED Visit recommended. Follow protocol based instructions.     Bring Your Own Device:  Please also bring your smart device(s) (smart phones, tablets, laptops) and their charging cables for your personal use and to communicate with your care team during your visit.    Thank you for taking steps to prevent the spread of this virus.  o Limit your contact with others.  o Wear a simple mask to cover your cough.  o Wash your hands well and often.    Resources    [facility]: About COVID-19: [email or web address]    CDC: What to Do If You're Sick: www.cdc.gov/coronavirus/2019-ncov/about/steps-when-sick.html    CDC: Ending Home Isolation: www.cdc.gov/coronavirus/2019-ncov/hcp/disposition-in-home-patients.html     CDC: Caring for Someone: www.cdc.gov/coronavirus/2019-ncov/if-you-are-sick/care-for-someone.html     [place]: Interim Guidance for Hospital Discharge to Home: www.health.[facility].[place]/diseases/coronavirus/hcp/hospdischarge.pdf    [facility] clinical trials (COVID-19 research studies):  clinicalaffairs.[facility]-clinical-trials     Below are the COVID-19 hotlines at the [place] Department of Health ([facility]). Interpreters are available.   o For health questions: Call 618-182-6495 or [phone] (7 a.m. to 7 p.m.)  o For questions about schools and childcare: Call 124-813-1542 or [phone] (7 a.m. to 7 p.m.)                         Reason for Disposition    SEVERE vaginal bleeding (i.e., soaking 2 pads or tampons per hour and present 2 or more hours; 1 menstrual cup every 2 hours)    Additional Information    Negative: Shock suspected (e.g., cold/pale/clammy skin, too weak to stand, low BP, rapid pulse)    Negative: Difficult to awaken or acting confused (e.g., disoriented, slurred speech)    Negative: Passed out (i.e., lost consciousness, collapsed and was not responding)    Negative: Sounds like a life-threatening emergency to the triager    Negative: SEVERE abdominal pain    Negative: SEVERE dizziness (e.g., unable to stand, requires support to walk, feels like passing out now)    Protocols used: VAGINAL BLEEDING - [id number]

## 2020-12-19 NOTE — TELEPHONE ENCOUNTER
Caller states she called earlier and was expecting a call back from provider regarding heavy vaginal bleeding. Caller states she is soaking 2 heavy tampons per hour and has been having a menstrual cycle for 2 months now. Caller complains of a headache. Triage guidelines per recent triage is to go to ED. Caller speaks as if she may be intoxicated. Caller verbalized and understands directives.  COVID 19 Nurse Triage Plan/Patient Instructions    Please be aware that novel coronavirus (COVID-19) may be circulating in the community. If you develop symptoms such as fever, cough, or SOB or if you have concerns about the presence of another infection including coronavirus (COVID-19), please contact your health care provider or visit [email or web address].     Disposition/Instructions    ED Visit recommended. Follow protocol based instructions.     Bring Your Own Device:  Please also bring your smart device(s) (smart phones, tablets, laptops) and their charging cables for your personal use and to communicate with your care team during your visit.    Thank you for taking steps to prevent the spread of this virus.  o Limit your contact with others.  o Wear a simple mask to cover your cough.  o Wash your hands well and often.    Resources    [facility]: About COVID-19: [email or web address]    CDC: What to Do If You're Sick: www.cdc.gov/coronavirus/2019-ncov/about/steps-when-sick.html    CDC: Ending Home Isolation: www.cdc.gov/coronavirus/2019-ncov/hcp/disposition-in-home-patients.html     CDC: Caring for Someone: www.cdc.gov/coronavirus/2019-ncov/if-you-are-sick/care-for-someone.html     [facility]: Interim Guidance for Hospital Discharge to Home: www.health.[facility].[place]/diseases/coronavirus/hcp/hospdischarge.pdf    [facility] clinical trials (COVID-19 research studies): clinicalaffairs.[facility]/umn-clinical-trials     Below are the COVID-19 hotlines at the [place] Department of Health ([facility]). Interpreters are  available.   o For health questions: Call 462-348-2388 or 1-434.732.7028 (7 a.m. to 7 p.m.)  o For questions about schools and childcare: Call 333-351-2831 or 1-452.288.7023 (7 a.m. to 7 p.m.)                     Additional Information    Negative: Caller is angry or rude (e.g., hangs up, verbally abusive, yelling)    Negative: Caller hangs up    Negative: Caller has already spoken with the PCP and has no further questions.    Negative: Caller has already spoken with another triager and has no further questions.    Caller has already spoken with another triager or PCP AND has further questions AND triager able to answer questions.    Protocols used: NO CONTACT OR DUPLICATE CONTACT CALL-A-

## 2021-01-15 ENCOUNTER — HEALTH MAINTENANCE LETTER (OUTPATIENT)
Age: 46
End: 2021-01-15

## 2021-02-25 ENCOUNTER — OFFICE VISIT (OUTPATIENT)
Dept: DERMATOLOGY | Facility: CLINIC | Age: 46
End: 2021-02-25
Payer: COMMERCIAL

## 2021-02-25 DIAGNOSIS — D48.5 NEOPLASM OF UNCERTAIN BEHAVIOR OF SKIN: Primary | ICD-10-CM

## 2021-02-25 DIAGNOSIS — Z12.83 SKIN EXAM FOR MALIGNANT NEOPLASM: ICD-10-CM

## 2021-02-25 DIAGNOSIS — D22.9 MULTIPLE BENIGN NEVI: ICD-10-CM

## 2021-02-25 PROCEDURE — 11102 TANGNTL BX SKIN SINGLE LES: CPT | Mod: GC

## 2021-02-25 PROCEDURE — 99202 OFFICE O/P NEW SF 15 MIN: CPT | Mod: 25

## 2021-02-25 PROCEDURE — 88305 TISSUE EXAM BY PATHOLOGIST: CPT | Performed by: DERMATOLOGY

## 2021-02-25 ASSESSMENT — PAIN SCALES - GENERAL: PAINLEVEL: NO PAIN (0)

## 2021-02-25 NOTE — PATIENT INSTRUCTIONS
Differin or adapalene over the counter at [facility]/target for spot on the left breast     Wound Care After a Biopsy    What is a skin biopsy?  A skin biopsy allows the doctor to examine a very small piece of tissue under the microscope to determine the diagnosis and the best treatment for the skin condition. A local anesthetic (numbing medicine)  is injected with a very small needle into the skin area to be tested. A small piece of skin is taken from the area. Sometimes a suture (stitch) is used.     What are the risks of a skin biopsy?  I will experience scar, bleeding, swelling, pain, crusting and redness. I may experience incomplete removal or recurrence. Risks of this procedure are excessive bleeding, bruising, infection, nerve damage, numbness, thick (hypertrophic or keloidal) scar and non-diagnostic biopsy.    How should I care for my wound for the first 24 hours?    Keep the wound dry and covered for 24 hours    If it bleeds, hold direct pressure on the area for 15 minutes. If bleeding does not stop then go to the emergency room    Avoid strenuous exercise the first 1-2 days or as your doctor instructs you    How should I care for the wound after 24 hours?    After 24 hours, remove the bandage    You may bathe or shower as normal    If you had a scalp biopsy, you can shampoo as usual and can use shower water to clean the biopsy site daily    Clean the wound twice a day with gentle soap and water    Do not scrub, be gentle    Apply white petroleum/Vaseline after cleaning the wound with a cotton swab or a clean finger, and keep the site covered with a Bandaid /bandage. Bandages are not necessary with a scalp biopsy    If you are unable to cover the site with a Bandaid /bandage, re-apply ointment 2-3 times a day to keep the site moist. Moisture will help with healing    Avoid strenuous activity for first 1-2 days    Avoid lakes, rivers, pools, and oceans until the stitches are removed or the site is  healed    How do I clean my wound?    Wash hands thoroughly with soap or use hand  before all wound care    Clean the wound with gentle soap and water    Apply white petroleum/Vaseline  to wound after it is clean    Replace the Bandaid /bandage to keep the wound covered for the first few days or as instructed by your doctor    If you had a scalp biopsy, warm shower water to the area on a daily basis should suffice    What should I use to clean my wound?     Cotton-tipped applicators (Qtips )    White petroleum jelly (Vaseline ). Use a clean new container and use Q-tips to apply.    Bandaids   as needed    Gentle soap     How should I care for my wound long term?    Do not get your wound dirty    Keep up with wound care for one week or until the area is healed.    A small scab will form and fall off by itself when the area is completely healed. The area will be red and will become pink in color as it heals. Sun protection is very important for how your scar will turn out. Sunscreen with an SPF 30 or greater is recommended once the area is healed.    If you have stitches, stitches need to be removed in  days. You may return to our clinic for this or you may have it done locally at your doctor s office.    You should have some soreness but it should be mild and slowly go away over several days. Talk to your doctor about using tylenol for pain,    When should I call my doctor?  If you have increased:     Pain or swelling    Pus or drainage (clear or slightly yellow drainage is ok)    Temperature over 100F    Spreading redness or warmth around wound    When will I hear about my results?  The biopsy results can take 2-3 weeks to come back. The clinic will call you with the results, send you a Giggle message, or have you schedule a follow-up clinic or phone time to discuss the results. Contact our clinics if you do not hear from us in 3 weeks.     Who should I call with questions?    [facility]  [facility]: [phone]     [facility]: [phone]    For urgent needs outside of business hours call the [facility] at [phone] and ask for the dermatology resident on call      Patient Education     Checking for Skin Cancer  You can find cancer early by checking your skin each month. There are 3 kinds of skin cancer. They are melanoma, basal cell carcinoma, and squamous cell carcinoma. Doing monthly skin checks is the best way to find new marks or skin changes. Follow the instructions below for checking your skin.   The ABCDEs of checking moles for melanoma   Check your moles or growths for signs of melanoma using ABCDE:     Asymmetry: the sides of the mole or growth don t match    Border: the edges are ragged, notched, or blurred    Color: the color within the mole or growth varies    Diameter: the mole or growth is larger than 6 mm (size of a pencil eraser)    Evolving: the size, shape, or color of the mole or growth is changing (evolving is not shown in the images below)    Checking for other types of skin cancer  Basal cell carcinoma or squamous cell carcinoma have symptoms such as:       A spot or mole that looks different from all other marks on your skin    Changes in how an area feels, such as itching, tenderness, or pain    Changes in the skin's surface, such as oozing, bleeding, or scaliness    A sore that does not heal    New swelling or redness beyond the border of a mole    Who s at risk?  Anyone can get skin cancer. But you are at greater risk if you have:     Fair skin, light-colored hair, or light-colored eyes    Many moles or abnormal moles on your skin    A history of sunburns from sunlight or tanning beds    A family history of skin cancer    A history of exposure to radiation or chemicals    A weakened immune system  If you have had skin cancer in the past, you are at risk for recurring skin cancer.   How to check your skin  Do your monthly  skin checkups in front of a full-length mirror. Check all parts of your body, including your:     Head (ears, face, neck, and scalp)    Torso (front, back, and sides)    Arms (tops, undersides, upper, and lower armpits)    Hands (palms, backs, and fingers, including under the nails)    Buttocks and genitals    Legs (front, back, and sides)    Feet (tops, soles, toes, including under the nails, and between toes)  If you have a lot of moles, take digital photos of them each month. Make sure to take photos both up close and from a distance. These can help you see if any moles change over time.   Most skin changes are not cancer. But if you see any changes in your skin, call your doctor right away. Only he or she can diagnose a problem. If you have skin cancer, seeing your doctor can be the first step toward getting the treatment that could save your life.   [employer] last reviewed this educational content on 4/1/2019 2000-2020 [employer]. [address] [place] [address]. All rights reserved. This information is not intended as a substitute for professional medical care. Always follow your healthcare professional's instructions.       When should I call my doctor?    If you are worsening or not improving, please, contact us or seek urgent care as noted below.     Who should I call with questions (adults)?    [facility] (adult and pediatric): [phone]     [facility] (adult): [phone]    For urgent needs outside of business hours call the [facility] at [phone] and ask for the dermatology resident on call    If this is a medical emergency and you are unable to reach an ER, Call [phone]      Who should I call with questions (pediatric)?  [facility]- Pediatric Dermatology  Dr. Anayeli Davila, Dr. Ricardo Posey, Dr. Camila Singh, Viola Kaur, [place]  Dr. Narcisa Gordon, Dr. Tanya Funes &  Dr. Junior Dale  Non Urgent  Nurse Triage Line; [phone]- Genevieve and Tamera RN Care Coordinators   [place] (/Complex ) [phone]    If you need a prescription refill, please contact your pharmacy. Refills are approved or denied by our Physicians during normal business hours, Monday through Fridays  Per office policy, refills will not be granted if you have not been seen within the past year (or sooner depending on your child's condition)    Scheduling Information:  Pediatric Appointment Scheduling and Call Center [phone]  Radiology Scheduling- [phone]  Sedation Unit Scheduling- [phone]  [place] Scheduling- General [phone]; Pediatric Dermatology [phone]  Main  Services: [phone]  [place]: [phone]  Citizen of the [place]: [phone]  Hmong/Bengali/Lacho: [phone]  Preadmission Nursing Department Fax Number: [phone] (Fax all pre-operative paperwork to this number)    For urgent matters arising during evenings, weekends, or holidays that cannot wait for normal business hours please call [phone] and ask for the Dermatology Resident On-Call to be paged.          Patient Education     Common Types of Skin Cancer  Skin cancer is a serious disease that can affect anyone at any age. It's the most common kind of cancer in the [place] If found early, when it's small and hasn't spread, skin cancer can often be treated with success. But in some cases, it's life-threatening.  Talk with your healthcare provider about what you can do to help prevent skin cancer. Ask about regular skin exams as part of your routine physicals. You can also ask about doing monthly self-exams of your skin. If you see any changes in your skin, call your healthcare provider right away.   Understanding the skin  The skin is made up of 3 layers: epidermis, dermis, and hypodermis or fat layer. The epidermis is the thin outer layer of the skin. It consists of 3 types of cells:  squamous cells, basal cells, and melanocytes. The squamous cells are flat cells in the outermost layer and are continuously shed. The basal cells are round cells found just beneath the squamous cells at the base of the epidermis. The melanocytes are found in every layer of the epidermis and make melanin. This give the skin its color. The dermis layer is the middle layer of skin and consists of blood and lymph vessels, hair follicles, oil and sweat glands, nerves and collagen. This layer give skin flexibility and strength. The fat layer is the deepest layer consisting of fat and collagen. It helps provide the body's heat and protects the body from injury.  Skin cancer is a type of cancer that grows in the epidermal layer of the skin. It can form in the basal cells, squamous cells, or melanocytes. Skin cancer can be grouped into 2 types: non-melanoma and melanoma. The 2 most common types of non-melanoma skin cancer are basal cell carcinoma and squamous cell carcinoma.  Basal cell cancer  Basal cell cancer is the most common skin cancer. It starts in basal cells in the deepest part of the epidermis. It's usually found on the face, ears, neck, trunk, or arms, but it can start anywhere. Varying in color, these lesions may be waxy, pearly, scaly, or scar-like. Tiny blood vessels can sometimes be seen through the lesion s surface. These lesions can bleed easily and might not heal well. Nearly all basal cell cancers can be treated and cured.  Squamous cell cancer  Squamous cell cancer is another type of skin cancer. It starts in flat cells called squamous cells in the upper part of the epidermis. Lesions often form on the face, ears, neck, hands, or arms. They can start in scars and sores that don't heal. The lesions are firm, red bumps or flat, scaly, crusty growths. Squamous cell carcinoma is more likely to grow and spread to other parts of the body than basal cell carcinoma, although this is still uncommon. Most squamous  cell carcinoma is found early enough to be treated and cured.  Melanoma  Melanoma is a less common, but much more dangerous kind of skin cancer. It starts in skin cells called melanocytes. It's more likely to grow and spread than basal or squamous cell cancers. It's often not easy to tell where a melanoma lesion s borders are. It's often brown or black, but it may be mixed in color. The shape and size of melanoma lesions tend to differ from one side to the other. Melanoma can start anywhere, like the genitals, mouth, palms of hands, bottoms of feet, and under the nails.  There are other types of skin cancer, too. These include Merkel cell cancer, Kaposi sarcoma, and skin (cutaneous) lymphomas, but these cancers are quite rare.  Actinic keratosis  Actinic keratosis is not skin cancer. It's a common, pre-cancer skin change that can turn into a squamous cell skin cancer over time if left untreated. Actinic keratosis lesions tend to appear on sun-exposed parts of the body. They can be pink, reddish-brown, or skin-colored. These lesions are most often small, raised, scaly, and rough, like sandpaper. In some cases, actinic keratosis lesions hurt. Most people with them have more than one lesion. Getting early treatment for actinic keratoses almost always cures the lesions.  [employer] last reviewed this educational content on 6/1/2019 2000-2020 [employer]. [address] [place] [address], [place] [address]. All rights reserved. This information is not intended as a substitute for professional medical care. Always follow your healthcare professional's instructions.

## 2021-02-25 NOTE — LETTER
2/25/2021       RE: Dontae Weston  [address] [place] [id number]     Dear Colleague,    Thank you for referring your patient, Dontae Weston, to the [facility]ERMATOLOG[facility] [place] [facility]. Please see a copy of my visit note below.    [facility] Dermatology Note  Encounter Date: Feb 25, 2021  Office Visit     Dermatology Problem List:  FBSE 2/25/21 - due every  6 months  0. NUB left upper chest, BCC   - shave biopsy 2/25/21     ____________________________________________    Assessment & Plan:     # NUB, left upper chest, BCC   - shave biopsy   - FBSE in 6 months    # Benign appearing nevi   - counseled importance of sun protection and prescribed that she purchase over the counter spf 30 or higher broad spectrum sunscreen   - we discussed the abcde of melanoma and signs and symptoms of skin cancer   - handouts provided    Procedures Performed:   - Shave biopsy procedure note, location(s): left upper chest. After discussion of benefits and risks including but not limited to bleeding, infection, scar, incomplete removal, recurrence, and non-diagnostic biopsy, written consent and photographs were obtained. The area was cleaned with isopropyl alcohol. 0.5mL of 1% lidocaine with epinephrine was injected to obtain adequate anesthesia of lesion(s). Shave biopsy at site(s) performed. Hemostasis was achieved with aluminium chloride. Petrolatum ointment and a sterile dressing were applied. The patient tolerated the procedure and no complications were noted. The patient was provided with verbal and written post care instructions.     Follow-up: 6 month(s) in-person, or earlier for new or changing lesions    Staff and Resident:     Chula Celaya MD  Dermatology Resident, PGY2    Patient was seen and examined with the dermatology resident. I agree with the history, review of systems, physical examination, assessments  and plan. I was present for the key portion of the biopsy procedure.    Tanya Funes MD  Professor and  Chair  Department of Dermatology  [facility]      ____________________________________________    CC: Derm Problem (Changing spot on chest - no personal or family hx of skin cancer.)    HPI:  Ms. Dontae Weston is a(n) 46 year old female who presents today as a new patient for lesion of concern on the chest.  Lesion present x few years and is growing.  Nontender and asymptomatic.  Usually tans in the sun.  No p/fhx skin cancer.  Does not use sunscreen.  Denies any other new, changing, bleeding, or nonhealing lesions.       Patient is otherwise feeling well, without additional skin concerns.    Labs Reviewed:  N/A    Physical Exam:  Vitals: There were no vitals taken for this visit.  SKIN: FBSE remarkable for benign appearing brown macules on the trunk and extremities, 1 cm pearly pink papule on the left upper chest with arborizing vessels    - No other lesions of concern on areas examined.     Medications:  No current outpatient medications on file.     No current facility-administered medications for this visit.       Past Medical History:   Patient Active Problem List   Diagnosis     CARDIOVASCULAR SCREENING; LDL GOAL LESS THAN 160     Chemical dependency (H)     Past Medical History:   Diagnosis Date     Abdominal pain, unspecified site      Chemical dependency (H)     alcohol; treated and sober since 2002     Dysplasia of cervix 1997    CRYO SURG     Panic attack        CC Chiqui Bender MD  [address] [facility]  [place] [address] on close of this encounter.

## 2021-02-26 LAB — COPATH REPORT: NORMAL

## 2021-02-27 DIAGNOSIS — C44.519 BASAL CELL CARCINOMA OF ANTERIOR CHEST: Primary | ICD-10-CM

## 2021-02-27 NOTE — PROGRESS NOTES
[facility] Dermatology Note  Encounter Date: Feb 25, 2021  Office Visit     Dermatology Problem List:  FBSE 2/25/21 - due every  6 months  0. NUB left upper chest, BCC   - shave biopsy 2/25/21     ____________________________________________    Assessment & Plan:     # NUB, left upper chest, BCC   - shave biopsy   - FBSE in 6 months    # Benign appearing nevi   - counseled importance of sun protection and prescribed that she purchase over the counter spf 30 or higher broad spectrum sunscreen   - we discussed the abcde of melanoma and signs and symptoms of skin cancer   - handouts provided    Procedures Performed:   - Shave biopsy procedure note, location(s): left upper chest. After discussion of benefits and risks including but not limited to bleeding, infection, scar, incomplete removal, recurrence, and non-diagnostic biopsy, written consent and photographs were obtained. The area was cleaned with isopropyl alcohol. 0.5mL of 1% lidocaine with epinephrine was injected to obtain adequate anesthesia of lesion(s). Shave biopsy at site(s) performed. Hemostasis was achieved with aluminium chloride. Petrolatum ointment and a sterile dressing were applied. The patient tolerated the procedure and no complications were noted. The patient was provided with verbal and written post care instructions.     Follow-up: 6 month(s) in-person, or earlier for new or changing lesions    Staff and Resident:     Chula Celaya MD  Dermatology Resident, PGY2    Patient was seen and examined with the dermatology resident. I agree with the history, review of systems, physical examination, assessments and plan. I was present for the key portion of the biopsy procedure.    Tanya Funes MD  Professor and  Chair  Department of Dermatology  [facility]      ____________________________________________    CC: Derm Problem (Changing spot on chest - no personal or family hx of skin cancer.)    HPI:  Ms.  Dontae Weston is a(n) 46 year old female who presents today as a new patient for lesion of concern on the chest.  Lesion present x few years and is growing.  Nontender and asymptomatic.  Usually tans in the sun.  No p/fhx skin cancer.  Does not use sunscreen.  Denies any other new, changing, bleeding, or nonhealing lesions.       Patient is otherwise feeling well, without additional skin concerns.    Labs Reviewed:  N/A    Physical Exam:  Vitals: There were no vitals taken for this visit.  SKIN: FBSE remarkable for benign appearing brown macules on the trunk and extremities, 1 cm pearly pink papule on the left upper chest with arborizing vessels    - No other lesions of concern on areas examined.     Medications:  No current outpatient medications on file.     No current facility-administered medications for this visit.       Past Medical History:   Patient Active Problem List   Diagnosis     CARDIOVASCULAR SCREENING; LDL GOAL LESS THAN 160     Chemical dependency (H)     Past Medical History:   Diagnosis Date     Abdominal pain, unspecified site      Chemical dependency (H)     alcohol; treated and sober since 2002     Dysplasia of cervix 1997    CRYO SURG     Panic attack        CC Chiqui Michele Bender [place]  [address] [place] [address] on close of this encounter.

## 2021-02-27 NOTE — RESULT ENCOUNTER NOTE
The patient has a nBCC on her left upper chest.  I informed her via voicemail and mychart.  I have entered an order to schedule patient for surgical excision with Dr. Reed.      Chula Celaya MD  Dermatology Resident, PGY2

## 2021-03-03 NOTE — TELEPHONE ENCOUNTER
FUTURE VISIT INFORMATION      FUTURE VISIT INFORMATION:    Date: 3.15.21    Time: 3:15    Location: Telephone  REFERRAL INFORMATION:    Referring provider:  Dr. Chula Celaya/Dr. Funes    Referring providers clinic:  [facility] Dermatology    Reason for visit/diagnosis  BCC Chest     RECORDS REQUESTED FROM:       Clinic name Comments Records Status Photos Status   [facility] Derm 2.25.21  Path # [id number] [facility] Epic

## 2021-03-08 ENCOUNTER — TELEPHONE (OUTPATIENT)
Dept: FAMILY MEDICINE | Facility: CLINIC | Age: 46
End: 2021-03-08

## 2021-03-08 NOTE — TELEPHONE ENCOUNTER
.Reason for call:  Other   Patient called regarding (reason for call): call back  Additional comments: pt would like a call back about her health. Offered pt a telephone appointment. She declined. Pt would like a call back.     Phone number to reach patient:  Home number on file [phone] (home)    Best Time:  anytime    Can we leave a detailed message on this number?  YES    Travel screening: Negative

## 2021-03-08 NOTE — TELEPHONE ENCOUNTER
"Routing to provider.  She refuses to give me any information.  States Dr. Junior Crane is her doctor and she wants to speak only to her doctor.  She states she knows who I am because she's spoken to me before but doesn't want to speak with me.  I let her know Dr. Junior Crane is not in clinic at all this week.  He has been checking his in basket each day but has already checked it today so may not check it again today.  She then states, \"Isn't he in the pool?  The person I spoke with said he's in the pool; now you say he's in the basket?\"  \"Did he get out of the pool and went into the basket?\"  I informed her that the  who took the message routed the message to the group of people who respond/assist to messages and then if necessary it is sent to the provider's in basket.  She continued to go on about her doctor being in the \"pool\" or the \"basket\".  I finally managed to stop that trail of conversation.  I again let her know Dr. Junior Crane is not in the office today and asked if I could help her or gather more information for Dr. Junior Crane.  She said no.  She said, \"Just let him know I want him to call me today\".  I again told her that I can not guarantee that he will see the message today.  He is not in clinic; has already checked his in basket this morning.  I again asked her if I could help her.  She said no and told me that I was to have Dr. Junior Crane call her.  I told her I would send her message to him but may not get response today.  JOSE MANUEL Gonzalez  "

## 2021-03-08 NOTE — TELEPHONE ENCOUNTER
I will not call her today and will not be in the office for 2 weeks.  She will have to talk to another provider.

## 2021-03-09 NOTE — TELEPHONE ENCOUNTER
Left message on answering machine for patient to call back. Did also let her know on voicemail that her doctor is not available and will need to speak with a nurse.  [phone]  Lisa FIELDS RN

## 2021-03-10 NOTE — TELEPHONE ENCOUNTER
Patient would like to speak with another provider,   Patient is wanting medication to be able to Detox at home, like an Ativan.  Patient reports she has seen Dr DINA Crane all her life and was hoping he would prescribe the medication.  Patient reported there were beds available at the Detox unit, however would prefer to do this at home and also concerned about Covid 19.   Discussed Dr DINA Crane's response below.   Discussed with Dr Singer, Verbal Orders patient will need to be treated at a facility.    Patient is informed of Verbal Orders.    Verbalized good understanding.     Per protocol, will route encounter to be cosigned by provider for Verbal Orders.  Cheryl Tello RN     Agree with plan. Would recommend detox center.    Cielo Fortune MD

## 2021-03-15 ENCOUNTER — PRE VISIT (OUTPATIENT)
Dept: DERMATOLOGY | Facility: CLINIC | Age: 46
End: 2021-03-15

## 2021-03-15 ENCOUNTER — VIRTUAL VISIT (OUTPATIENT)
Dept: DERMATOLOGY | Facility: CLINIC | Age: 46
End: 2021-03-15
Payer: COMMERCIAL

## 2021-03-15 DIAGNOSIS — C44.519 BCC (BASAL CELL CARCINOMA), CHEST: Primary | ICD-10-CM

## 2021-03-15 PROCEDURE — 99213 OFFICE O/P EST LOW 20 MIN: CPT | Mod: 95 | Performed by: DERMATOLOGY

## 2021-03-15 ASSESSMENT — PAIN SCALES - GENERAL: PAINLEVEL: NO PAIN (0)

## 2021-03-15 NOTE — LETTER
3/15/2021       RE: Dontae Weston  [address] [address]     Dear Colleague,    Thank you for referring your patient, Dontae Weston, to the [facility] DERMATOLOG[facility] SURGERY [facility]IN[facility] [place] at [facility]. Please see a copy of my visit note below.    [facility] Dermatology Note  Encounter Date: Mar 15, 2021  Store-and-Forward and Telephone ([phone]). Location of teledermatologist: [facility] [place].  Start time: 1515. End time: 1525.    Dermatology Problem List:  1. BCC, left upper chest, s/p biopsy 2/25/2021    ____________________________________________    Assessment & Plan:     1. BCC, left upper chest, s/p biopsy 2/25/2021  - Discussed the nature of the diagnosis/condition  - Discussed the treatment options, including the risks benefits and expectations of these options.   - Recommended excision, patient agrees to proceed with scheduling      Procedures Performed:    None    Follow-up: for surgery    Staff and Fellow:     Von Puckett MD  PGY-6    Micrographic Surgery and Dermatologic Oncology Fellow  March 15, 2021      ____________________________________________    CC: Derm Problem (consult for BCC chest. )    HPI:  Ms. Dontae Weston is a(n) 46 year old female who presents today as a new patient for consultation for excision of BCC of the left upper chest.     Patient is otherwise feeling well, without additional skin concerns.    Labs Reviewed:  N/A    Physical Exam:  Vitals: There were no vitals taken for this visit.  SKIN: Teledermatology photos were reviewed; image quality and interpretability: acceptable. Image date: 2/25/2021.  - Over the left upper chest is a pink, pearly appearing plaque   - No other lesions of concern on areas examined.     Medications:  No current outpatient medications on file.     No current facility-administered medications for this  visit.       Past Medical/Surgical History:   Patient Active Problem List   Diagnosis     CARDIOVASCULAR SCREENING; LDL GOAL LESS THAN 160     Chemical dependency (H)     Past Medical History:   Diagnosis Date     Abdominal pain, unspecified site      Chemical dependency (H)     alcohol; treated and sober since 2002     Dysplasia of cervix 1997    CRYO SURG     Panic attack        CC Tanya Funes MD  [address] [facility] [address] [facility]  [place] [address] on close of this encounter.    Attestation signed by Matheus Reed MD at 4/7/2021  9:38 AM:  Attending Attestation:  I personally interviewed and examined the patient.  The patient was discussed with the resident.  I reviewed the resident note and agree with the findings.  Any edits are below.    History: BCC On chest    PE: pearly papule    A/P: BCC on chest -- schedule surgery.      Matheus Reed M.D.  Professor  Director of Dermatologic Surgery  Department of Dermatology

## 2021-03-15 NOTE — NURSING NOTE
Chief Complaint   Patient presents with     Derm Problem     consult for BCC chest.      Osiris ALANIS CMA

## 2021-03-15 NOTE — PROGRESS NOTES
[facility] Dermatology Note  Encounter Date: Mar 15, 2021  Store-and-Forward and Telephone ([phone]). Location of teledermatologist: [facility]OLOG[facility] SUR[facility] [place].  Start time: 1515. End time: 1525.    Dermatology Problem List:  1. BCC, left upper chest, s/p biopsy 2/25/2021    ____________________________________________    Assessment & Plan:     1. BCC, left upper chest, s/p biopsy 2/25/2021  - Discussed the nature of the diagnosis/condition  - Discussed the treatment options, including the risks benefits and expectations of these options.   - Recommended excision, patient agrees to proceed with scheduling      Procedures Performed:    None    Follow-up: for surgery    Staff and Fellow:     Von Puckett MD  PGY-6    Micrographic Surgery and Dermatologic Oncology Fellow  March 15, 2021      ____________________________________________    CC: Derm Problem (consult for BCC chest. )    HPI:  Ms. Dontae Weston is a(n) 46 year old female who presents today as a new patient for consultation for excision of BCC of the left upper chest.     Patient is otherwise feeling well, without additional skin concerns.    Labs Reviewed:  N/A    Physical Exam:  Vitals: There were no vitals taken for this visit.  SKIN: Teledermatology photos were reviewed; image quality and interpretability: acceptable. Image date: 2/25/2021.  - Over the left upper chest is a pink, pearly appearing plaque   - No other lesions of concern on areas examined.     Medications:  No current outpatient medications on file.     No current facility-administered medications for this visit.       Past Medical/Surgical History:   Patient Active Problem List   Diagnosis     CARDIOVASCULAR SCREENING; LDL GOAL LESS THAN 160     Chemical dependency (H)     Past Medical History:   Diagnosis Date     Abdominal pain, unspecified site      Chemical dependency (H)     alcohol; treated and sober since 2002      Dysplasia of cervix 1997    CRYO SURG     Panic attack        CC Tanya Funes MD  [address] [facility] [address]  [place] [address] on close of this encounter.

## 2021-04-27 ENCOUNTER — TELEPHONE (OUTPATIENT)
Dept: DERMATOLOGY | Facility: CLINIC | Age: 46
End: 2021-04-27

## 2021-04-27 NOTE — TELEPHONE ENCOUNTER
[facility] Call Center    Phone Message    May a detailed message be left on voicemail: yes     Reason for Call: Other: `      Pt called to cancel 4/27/21 appt at 1:30pm with Dr Reed due to a sick child. Pt would like to reschedule. Please call Pt to reschedule.    Thanks!    Action Taken: Message routed to:  [facility] Derm    Travel Screening: Not Applicable

## 2021-04-30 NOTE — TELEPHONE ENCOUNTER
[facility] Call [facility]    Phone Message    May a detailed message be left on voicemail: yes     Reason for Call: Other: Pt called regarding below. Pt has not heard back from anyone within 24 hours. Pt would like to r/s her procedure with Dr. Reed. Please call her back. Thanks     Action Taken: Message routed to:  [facility] DERM    Travel Screening: Not Applicable

## 2021-05-03 NOTE — TELEPHONE ENCOUNTER
[facility] Call Center    Phone Message    May a detailed message be left on voicemail: yes     Reason for Call: Appointment Intake    Referring Provider Name: NA  Diagnosis and/or Symptoms: Chest Excision - Pt is waiting for a call back to reschedule her 04/27 Appt. Thank you      Action Taken: Message routed to:  [facility] Derm    Travel Screening: Not Applicable

## 2021-05-05 NOTE — TELEPHONE ENCOUNTER
[facility] Call Center    Phone Message    May a detailed message be left on voicemail: yes     Reason for Call: Appointment Intake    Referring Provider Name: Tanya Funes MD in [facility]   Diagnosis and/or Symptoms: Basal cell carcinoma of anterior chest - Please call Pt back to schedule, Thank you.    Action Taken: Message routed to:  [facility] Derm    Travel Screening: Not Applicable

## 2021-05-07 NOTE — TELEPHONE ENCOUNTER
sonya apologizing for the phone tag and advised I would send a [employer] message.     Osiris ALANIS CMA

## 2021-07-26 ENCOUNTER — TELEPHONE (OUTPATIENT)
Dept: DERMATOLOGY | Facility: CLINIC | Age: 46
End: 2021-07-26

## 2021-07-26 NOTE — TELEPHONE ENCOUNTER
Patient declined to be rescheduled.   Pt is aware of this appointment being cancelled, will like to reschedule after being rescheduled her Derm Surg with Dr. Reed.    Grace Hurt  Dermatology Clinic Coordinator]  7/26/21

## 2021-07-26 NOTE — TELEPHONE ENCOUNTER
Called and left message for patient to return call and rescheduled previous appointment with Dr. Reed.    Tay Silveira, Procedure

## 2021-09-05 ENCOUNTER — HEALTH MAINTENANCE LETTER (OUTPATIENT)
Age: 46
End: 2021-09-05

## 2021-10-15 ENCOUNTER — NURSE TRIAGE (OUTPATIENT)
Dept: FAMILY MEDICINE | Facility: CLINIC | Age: 46
End: 2021-10-15

## 2021-10-15 NOTE — TELEPHONE ENCOUNTER
"Self medicating with alcohol reports having a few beers last night but nothing this morning. Taking Tylenol for pain. Since being seen in the ER on 10/13 patient's symptoms are worsening. She has developed shortness of breath due to pain. She can log roll out of bed to go to the bathroom but otherwise is unable to move. She reports weakness in her legs and intermittent numbness in her left arm. Due to worsening symptoms and ER's request patient come back if SOB develops. Requested patient go to the ER. Patient does not have transportation and reports being unable to sit in a car. Requested she call 911 for transportation. Patient will go to [facility].     Reason for Disposition    Weakness (i.e., paralysis, loss of muscle strength) of the leg(s) or foot and sudden onset after back injury    Answer Assessment - Initial Assessment Questions  1. MECHANISM: \"How did the injury happen?\" (Consider the possibility of domestic violence or elder abuse)      10/13 fell down some stairs (would not disclose how this happened)  2. ONSET: \"When did the injury happen?\" (Minutes or hours ago)      10/13 in the morning.   3. LOCATION: \"What part of the back is injured?\"      Middle back neck and shoulders.   4. SEVERITY: \"Can you move the back normally?\"      Can barely move  5. PAIN: \"Is there any pain?\" If so, ask: \"How bad is the pain?\"   (Scale 1-10; or mild, moderate, severe)      With movement its an 8-10 and at rest pain is a 4 on a scale of 1-10.   6. CORD SYMPTOMS: Any weakness or numbness of the arms or legs?\"      Left arm tingles intermittently. Legs feel a little weak.   7. SIZE: For cuts, bruises, or swelling, ask: \"How large is it?\" (e.g., inches or centimeters)      Has not visualized.   8. TETANUS: For any breaks in the skin, ask: \"When was the last tetanus booster?\"      n/a  9. OTHER SYMPTOMS: \"Do you have any other symptoms?\" (e.g., abdominal pain, blood in urine)      Shortness of breath due to avoiding " "pain.   10. PREGNANCY: \"Is there any chance you are pregnant?\" \"When was your last menstrual period?\"        Abstinence    Protocols used: BACK INJURY-A-OH    Cathy Valenzuela RN on 10/15/2021 at 7:39 AM    "

## 2021-10-18 ENCOUNTER — TRANSFERRED RECORDS (OUTPATIENT)
Dept: HEALTH INFORMATION MANAGEMENT | Facility: CLINIC | Age: 46
End: 2021-10-18
Payer: COMMERCIAL

## 2021-11-12 ENCOUNTER — TRANSFERRED RECORDS (OUTPATIENT)
Dept: HEALTH INFORMATION MANAGEMENT | Facility: CLINIC | Age: 46
End: 2021-11-12
Payer: COMMERCIAL

## 2021-12-10 ENCOUNTER — TELEPHONE (OUTPATIENT)
Dept: DERMATOLOGY | Facility: CLINIC | Age: 46
End: 2021-12-10
Payer: COMMERCIAL

## 2021-12-10 NOTE — TELEPHONE ENCOUNTER
[facility] Call Center    Phone Message    May a detailed message be left on voicemail: yes     Reason for Call: Other: Pt fractured back. May need to r/s. Wants to discuss if ok to postpone. Please call to discuss.     Action Taken: Message routed to:  [facility] JESUS    Travel Screening: Not Applicable

## 2021-12-10 NOTE — TELEPHONE ENCOUNTER
Patient plans to keep appointment as scheduled. She had the initial biopsy in 2/2021 meaning it has been nearly a year since the BCC was identified. She reports laying flat for the procedure will not be an issue.

## 2021-12-13 ENCOUNTER — TRANSFERRED RECORDS (OUTPATIENT)
Dept: HEALTH INFORMATION MANAGEMENT | Facility: CLINIC | Age: 46
End: 2021-12-13
Payer: COMMERCIAL

## 2021-12-15 ENCOUNTER — TELEPHONE (OUTPATIENT)
Dept: DERMATOLOGY | Facility: CLINIC | Age: 46
End: 2021-12-15

## 2021-12-15 NOTE — TELEPHONE ENCOUNTER
[facility] Call Center    Phone Message    May a detailed message be left on voicemail: yes     Reason for Call: Appointment Intake    Referring Provider Name: NA  Diagnosis and/or Symptoms: excision on chest - Pt cancelled her Appt for 12/15 due to being ill. Pt would like to reschedule her Appt. Please call Pt back to discuss. Thanks    Action Taken: Message routed to:  [facility] Derm    Travel Screening: Not Applicable

## 2022-02-03 ENCOUNTER — TRANSFERRED RECORDS (OUTPATIENT)
Dept: HEALTH INFORMATION MANAGEMENT | Facility: CLINIC | Age: 47
End: 2022-02-03

## 2022-02-20 ENCOUNTER — HEALTH MAINTENANCE LETTER (OUTPATIENT)
Age: 47
End: 2022-02-20

## 2022-05-08 ENCOUNTER — TELEPHONE (OUTPATIENT)
Dept: FAMILY MEDICINE | Facility: CLINIC | Age: 47
End: 2022-05-08

## 2022-05-08 NOTE — TELEPHONE ENCOUNTER
Reason for Call:  Other call back    Detailed comments: patient was sick and did a home covid test and needs a note so she doesn't get fired    Phone Number Patient can be reached at: Home number on file [phone] (home)    Best Time: anytime    Can we leave a detailed message on this number? YES    Call taken on 5/8/2022 at 6:20 PM by Miesha Allen

## 2022-05-08 NOTE — LETTER
[facility]  [address] [id number] [phone]          May 11, 2022    RE:  Dontae Weston                                                                                                                                                       [address] [facility] [address]            To whom it may concern:    Dontae Weston is under my professional care for Covid. She was unable to work May 3rd thru May 11, 2022.  She may return to work tomorrow May 12    Sincerely,        Junior Crane MD

## 2022-05-11 NOTE — TELEPHONE ENCOUNTER
Pt calling back to report dates for work note. May 3rd thru May 11, 2022. Pt hoping to return to work tomorrow may 12, 22.    Mail letter to home address, [address] [place] [phone]  [address]

## 2022-05-11 NOTE — TELEPHONE ENCOUNTER
Called patient to let her know letter was ready, pt wants letter mailed, putting it in mail now.      [address]

## 2022-05-13 ENCOUNTER — NURSE TRIAGE (OUTPATIENT)
Dept: FAMILY MEDICINE | Facility: CLINIC | Age: 47
End: 2022-05-13

## 2022-05-13 NOTE — TELEPHONE ENCOUNTER
"Pt reporting pale-colored stools, frequent stringy stools approximately 10 times daily. Pt also requesting letter extending time off from work.    Pt advised to be seen in urgent care.    Urgent Care is available Monday - Friday at [facility] from 10 am - 8 pm, and 9 am - 8 pm on Saturday and Sunday.  Urgent Care is available Monday - Friday at [facility] from 10 am - 8 pm, and 9 am - 5 pm on Saturday and Sunday.    Reason for Disposition    [1] Stool is light gray or whitish AND [2] unexplained    Additional Information    Negative: Rectal bleeding, bloody stools, or blood in stool (bowel movement)    Negative: Tarry or jet black-colored stool    Negative: [1] Stool color is black (not dark green) AND [2] patient hasn't swallowed substance that causes black stools (e.g., Pepto-Bismol, iron pills)    Negative: Diarrhea stools (i.e., watery stools, or increase in the frequency and looseness of stools)    Negative: [1] Abdomen pain is main symptom AND [2] male    Negative: [1] Abdomen pain is main symptom AND [2] adult female    Negative: Yellow eyes (jaundice)    Negative: Patient sounds very sick or weak to the triager    Answer Assessment - Initial Assessment Questions  1. COLOR: \"What color is it?\" \"Is that color in part or all of the stool?\"      Light, tan stools.   2. OTHER SYMPTOMS: \"Do you have any other symptoms?\" (e.g., diarrhea, jaundice, abdominal pain, fever).  Pt inquiring if her pale stool may indicate liver problem. Pt states she is having approximately 10 stools per day and stools are stringy. Pt was advised to be seen today.    Protocols used: STOOLS - UNUSUAL COLOR-A-AH    "

## 2022-05-18 ENCOUNTER — TELEPHONE (OUTPATIENT)
Dept: FAMILY MEDICINE | Facility: CLINIC | Age: 47
End: 2022-05-18

## 2022-05-18 NOTE — TELEPHONE ENCOUNTER
"Patient reports was told 5/13/22 was to be seen due to ongoing diarrhea, \"stringy stools\", up to 10 x per day.  Last urination, last night, had Pedialyte last night.  Denies dry lips or mouth.  Requesting work note to excuse from work.  Advise evaluation.  Last office visit with Dr DINA Crane, 6/7/18.  Discussed stool samples.  Patient requests virtual visit, per Dr DINA Crane, needs appointment.  Patient is poor historian.  Patient express frustration that Dr DINA Crane would not write work note.  Discussed good care/medical practice.  Based on  No change in symptoms, previous triage needs appointment.  Difficulty obtaining hydration status.  Discussed signs and symptoms of dehydration, which include infrequent urination less than 1 time in 8 hours, dark yellow urine, excessive thirst, dry cracked lips, unable to produce tears, dry mouth, pinch skin that does not spring back, dizziness when rising to sitting or standing position, feeling faint, would require an emergency department visit.  Patient stated \"I will not go to emergency department\", patient will go to Urgent Care.  Hours provided.  Changing symptoms to report to emergency department.   Verbalized good understanding.     Please cosign for Verbal Orders.     Cheryl Tello RN       "

## 2022-08-18 ENCOUNTER — TELEPHONE (OUTPATIENT)
Dept: DERMATOLOGY | Facility: CLINIC | Age: 47
End: 2022-08-18

## 2022-08-18 NOTE — TELEPHONE ENCOUNTER
Called patient to schedule surgery with Dr. Reed    Date of Surgery: 11/1    Surgery type: excision    Consult scheduled: Not Applicable    Has patient had mohs before? Not Applicable    Additional comments: reschedule from December 21      Ayanna Huerta on 8/18/2022 at 3:26 PM

## 2022-08-18 NOTE — TELEPHONE ENCOUNTER
[facility] Call Center    Phone Message    May a detailed message be left on voicemail: yes     Reason for Call: Appointment Intake    Referring Provider Name: NA  Diagnosis and/or Symptoms: excision on chest  Pt was scheduled 12/15/21 and things came up and pt had to cancel. Pt would like to schedule Appt. Please call pt to discuss. Thanks     Action Taken: Message routed to:  [facility] Derm    Travel Screening: Not Applicable

## 2022-08-18 NOTE — TELEPHONE ENCOUNTER
Left patient a voicemail to reschedule excision with Dr. Reed. Provided my direct phone number.    Ayanna Huerta, Procedure

## 2022-09-14 ENCOUNTER — OFFICE VISIT (OUTPATIENT)
Dept: FAMILY MEDICINE | Facility: CLINIC | Age: 47
End: 2022-09-14
Payer: COMMERCIAL

## 2022-09-14 VITALS
TEMPERATURE: 97.4 F | HEIGHT: 64 IN | SYSTOLIC BLOOD PRESSURE: 120 MMHG | DIASTOLIC BLOOD PRESSURE: 77 MMHG | HEART RATE: 56 BPM | RESPIRATION RATE: 18 BRPM | WEIGHT: 107 LBS | BODY MASS INDEX: 18.27 KG/M2 | OXYGEN SATURATION: 100 %

## 2022-09-14 DIAGNOSIS — Z12.4 SCREENING FOR MALIGNANT NEOPLASM OF CERVIX: ICD-10-CM

## 2022-09-14 DIAGNOSIS — Z12.11 COLON CANCER SCREENING: ICD-10-CM

## 2022-09-14 DIAGNOSIS — Z00.00 ROUTINE GENERAL MEDICAL EXAMINATION AT A HEALTH CARE FACILITY: Primary | ICD-10-CM

## 2022-09-14 DIAGNOSIS — Z11.59 ENCOUNTER FOR HEPATITIS C SCREENING TEST FOR LOW RISK PATIENT: ICD-10-CM

## 2022-09-14 DIAGNOSIS — Z12.31 ENCOUNTER FOR SCREENING MAMMOGRAM FOR BREAST CANCER: ICD-10-CM

## 2022-09-14 DIAGNOSIS — Z13.1 SCREENING FOR DIABETES MELLITUS: ICD-10-CM

## 2022-09-14 DIAGNOSIS — N91.2 AMENORRHEA: ICD-10-CM

## 2022-09-14 DIAGNOSIS — Z13.220 LIPID SCREENING: ICD-10-CM

## 2022-09-14 LAB — FSH SERPL IRP2-ACNC: 84.5 MIU/ML

## 2022-09-14 PROCEDURE — 87624 HPV HI-RISK TYP POOLED RSLT: CPT | Performed by: FAMILY MEDICINE

## 2022-09-14 PROCEDURE — 83001 ASSAY OF GONADOTROPIN (FSH): CPT | Performed by: FAMILY MEDICINE

## 2022-09-14 PROCEDURE — 36415 COLL VENOUS BLD VENIPUNCTURE: CPT | Performed by: FAMILY MEDICINE

## 2022-09-14 PROCEDURE — G0145 SCR C/V CYTO,THINLAYER,RESCR: HCPCS | Performed by: FAMILY MEDICINE

## 2022-09-14 PROCEDURE — 80061 LIPID PANEL: CPT | Performed by: FAMILY MEDICINE

## 2022-09-14 PROCEDURE — 86803 HEPATITIS C AB TEST: CPT | Performed by: FAMILY MEDICINE

## 2022-09-14 PROCEDURE — 80053 COMPREHEN METABOLIC PANEL: CPT | Performed by: FAMILY MEDICINE

## 2022-09-14 PROCEDURE — 99396 PREV VISIT EST AGE 40-64: CPT | Performed by: FAMILY MEDICINE

## 2022-09-14 ASSESSMENT — ENCOUNTER SYMPTOMS
FEVER: 0
EYE PAIN: 0
ABDOMINAL PAIN: 0
SHORTNESS OF BREATH: 0
HEMATOCHEZIA: 0
ARTHRALGIAS: 0
CONSTIPATION: 0
COUGH: 0
PALPITATIONS: 0
WEAKNESS: 0
SORE THROAT: 0
FREQUENCY: 0
NAUSEA: 0
NERVOUS/ANXIOUS: 0
HEADACHES: 0
DYSURIA: 0
MYALGIAS: 0
PARESTHESIAS: 0
JOINT SWELLING: 0
CHILLS: 0
BREAST MASS: 0
DIARRHEA: 0
DIZZINESS: 0
HEMATURIA: 0
HEARTBURN: 0

## 2022-09-14 ASSESSMENT — PAIN SCALES - GENERAL: PAINLEVEL: NO PAIN (0)

## 2022-09-14 NOTE — PROGRESS NOTES
SUBJECTIVE:   CC: Dontae is an 47 year old who presents for preventive health visit.       Patient has been advised of split billing requirements and indicates understanding: Yes  Healthy Habits:     Getting at least 3 servings of Calcium per day:  Yes    Bi-annual eye exam:  NO    Dental care twice a year:  Yes    Sleep apnea or symptoms of sleep apnea:  None    Diet:  Regular (no restrictions)    Frequency of exercise:  6-7 days/week    Duration of exercise:  45-60 minutes    Taking medications regularly:  Not Applicable    Medication side effects:  Not applicable    PHQ-2 Total Score: 0    Additional concerns today:  No      Has not had a period in a year, wondering if there is a test that can be done to figure out if its early menopausal.     Today's PHQ-2 Score:   PHQ-2 ( 1999 Pf[employer]) 9/14/2022   Q1: Little interest or pleasure in doing things 0   Q2: Feeling down, depressed or hopeless 0   PHQ-2 Score 0   PHQ-2 Total Score (12-17 Years)- Positive if 3 or more points; Administer PHQ-A if positive -   Q1: Little interest or pleasure in doing things Not at all   Q2: Feeling down, depressed or hopeless Not at all   PHQ-2 Score 0       Abuse: Current or Past (Physical, Sexual or Emotional) - No  Do you feel safe in your environment? Yes    Have you ever done Advance Care Planning? (For example, a Health Directive, POLST, or a discussion with a medical provider or your loved ones about your wishes): No, advance care planning information given to patient to review.  Patient plans to discuss their wishes with loved ones or provider.      Social History     Tobacco Use     Smoking status: Current Some Day Smoker     Packs/day: 0.08     Types: Cigarettes     Smokeless tobacco: Never Used     Tobacco comment: 5 cigs daily   Substance Use Topics     Alcohol use: No     If you drink alcohol do you typically have >3 drinks per day or >7 drinks per week? No    Alcohol Use 9/14/2022   Prescreen: >3 drinks/day or >7  drinks/week? Not Applicable   Prescreen: >3 drinks/day or >7 drinks/week? -   No flowsheet data found.    Reviewed orders with patient.  Reviewed health maintenance and updated orders accordingly - Yes       Breast Cancer Screening:    FHS-7: No flowsheet data found.  click delete button to remove this line now  Mammogram Screening: Recommended annual mammography  Pertinent mammograms are reviewed under the imaging tab.    History of abnormal Pap smear: NO - age 30-65 PAP every 5 years with negative HPV co-testing recommended  PAP / HPV Latest Ref Rng & Units 10/4/2017 5/5/2014 10/30/2012   PAP (Historical) - NIL NIL NIL   HPV16 NEG:Negative Negative - -   HPV18 NEG:Negative Negative - -   HRHPV NEG:Negative Negative - -     Reviewed and updated as needed this visit by clinical staff   Tobacco  Allergies  Meds   Med Hx  Surg Hx  Fam Hx  Soc Hx          Reviewed and updated as needed this visit by Provider                       Review of Systems   Constitutional: Negative for chills and fever.   HENT: Negative for congestion, ear pain, hearing loss and sore throat.    Eyes: Negative for pain and visual disturbance.   Respiratory: Negative for cough and shortness of breath.    Cardiovascular: Negative for chest pain, palpitations and peripheral edema.   Gastrointestinal: Negative for abdominal pain, constipation, diarrhea, heartburn, hematochezia and nausea.   Breasts:  Negative for tenderness, breast mass and discharge.   Genitourinary: Negative for dysuria, frequency, genital sores, hematuria, pelvic pain, urgency, vaginal bleeding and vaginal discharge.   Musculoskeletal: Negative for arthralgias, joint swelling and myalgias.   Skin: Negative for rash.   Neurological: Negative for dizziness, weakness, headaches and paresthesias.   Psychiatric/Behavioral: Negative for mood changes. The patient is not nervous/anxious.      No menstrual period for over one year.     OBJECTIVE:   /77   Pulse 56   Temp  "97.4  F (36.3  C) (Tympanic)   Resp 18   Ht 1.613 m (5' 3.5\")   Wt 48.5 kg (107 lb)   SpO2 100%   BMI 18.66 kg/m    Physical Exam  GENERAL: healthy, alert and no distress  EYES: Eyes grossly normal to inspection, PERRL and conjunctivae and sclerae normal  HENT: ear canals and TM's normal, nose and mouth without ulcers or lesions  NECK: no adenopathy, no asymmetry, masses, or scars and thyroid normal to palpation  RESP: lungs clear to auscultation - no rales, rhonchi or wheezes  CV: regular rate and rhythm, normal S1 S2, no S3 or S4, no murmur, click or rub, no peripheral edema and peripheral pulses strong  ABDOMEN: soft, nontender, no hepatosplenomegaly, no masses and bowel sounds normal  MS: no gross musculoskeletal defects noted, no edema  SKIN: no suspicious lesions or rashes  NEURO: Normal strength and tone, mentation intact and speech normal  PSYCH: mentation appears normal, affect normal/bright  Pap preformed  Diagnostic Test Results:  Labs pending    ASSESSMENT/PLAN:       ICD-10-CM    1. Routine general medical examination at a health care facility  Z00.00    2. Screening for malignant neoplasm of cervix  Z12.4 Pap Screen with HPV - recommended age 30 - 65 years   3. Encounter for screening mammogram for breast cancer  Z12.31 MA SCREENING DIGITAL BILAT - Future  (s+30)   4. Colon cancer screening  Z12.11 COLOGUARD(EXACT SCIENCES)   5. Lipid screening  Z13.220 Lipid panel reflex to direct LDL Non-fasting   6. Encounter for hepatitis C screening test for low risk patient  Z11.59 Hepatitis C Screen Reflex to HCV RNA Quant and Genotype   7. Screening for diabetes mellitus  Z13.1 **Comprehensive metabolic panel FUTURE 2mo       Patient has been advised of split billing requirements and indicates understanding: Yes    COUNSELING:  Reviewed preventive health counseling, as reflected in patient instructions       Regular exercise       Healthy diet/nutrition    Estimated body mass index is 18.66 kg/m  as " "calculated from the following:    Height as of this encounter: 1.613 m (5' 3.5\").    Weight as of this encounter: 48.5 kg (107 lb).        She reports that she has been smoking cigarettes. She has been smoking about 0.08 packs per day. She has never used smokeless tobacco.  Nicotine/Tobacco Cessation Plan:   discussed      Counseling Resources:  ATP IV Guidelines  Pooled Cohorts Equation Calculator  Breast Cancer Risk Calculator  BRCA-Related Cancer Risk Assessment: FHS-7 Tool  FRAX Risk Assessment  ICSI Preventive Guidelines  Dietary Guidelines for Americans, 2010  USDA's MyPlate  ASA Prophylaxis  Lung CA Screening    Junior Crane MD  [facility]  "

## 2022-09-15 LAB
ALBUMIN SERPL-MCNC: 4.2 G/DL (ref 3.4–5)
ALP SERPL-CCNC: 67 U/L (ref 40–150)
ALT SERPL W P-5'-P-CCNC: 16 U/L (ref 0–50)
ANION GAP SERPL CALCULATED.3IONS-SCNC: 4 MMOL/L (ref 3–14)
AST SERPL W P-5'-P-CCNC: 13 U/L (ref 0–45)
BILIRUB SERPL-MCNC: 0.2 MG/DL (ref 0.2–1.3)
BUN SERPL-MCNC: 6 MG/DL (ref 7–30)
CALCIUM SERPL-MCNC: 9.3 MG/DL (ref 8.5–10.1)
CHLORIDE BLD-SCNC: 108 MMOL/L (ref 94–109)
CHOLEST SERPL-MCNC: 215 MG/DL
CO2 SERPL-SCNC: 28 MMOL/L (ref 20–32)
CREAT SERPL-MCNC: 0.46 MG/DL (ref 0.52–1.04)
FASTING STATUS PATIENT QL REPORTED: YES
GFR SERPL CREATININE-BSD FRML MDRD: >90 ML/MIN/1.73M2
GLUCOSE BLD-MCNC: 96 MG/DL (ref 70–99)
HCV AB SERPL QL IA: NONREACTIVE
HDLC SERPL-MCNC: 40 MG/DL
LDLC SERPL CALC-MCNC: 136 MG/DL
NONHDLC SERPL-MCNC: 175 MG/DL
POTASSIUM BLD-SCNC: 4.1 MMOL/L (ref 3.4–5.3)
PROT SERPL-MCNC: 8 G/DL (ref 6.8–8.8)
SODIUM SERPL-SCNC: 140 MMOL/L (ref 133–144)
TRIGL SERPL-MCNC: 194 MG/DL

## 2022-09-17 LAB
BKR LAB AP GYN ADEQUACY: NORMAL
BKR LAB AP GYN INTERPRETATION: NORMAL
BKR LAB AP HPV REFLEX: NORMAL
BKR LAB AP PREVIOUS ABNORMAL: NORMAL
PATH REPORT.COMMENTS IMP SPEC: NORMAL
PATH REPORT.COMMENTS IMP SPEC: NORMAL
PATH REPORT.RELEVANT HX SPEC: NORMAL

## 2022-09-20 LAB
HUMAN PAPILLOMA VIRUS 16 DNA: NEGATIVE
HUMAN PAPILLOMA VIRUS 18 DNA: NEGATIVE
HUMAN PAPILLOMA VIRUS FINAL DIAGNOSIS: NORMAL
HUMAN PAPILLOMA VIRUS OTHER HR: NEGATIVE

## 2022-09-30 ENCOUNTER — ANCILLARY PROCEDURE (OUTPATIENT)
Dept: MAMMOGRAPHY | Facility: CLINIC | Age: 47
End: 2022-09-30
Attending: FAMILY MEDICINE
Payer: COMMERCIAL

## 2022-09-30 DIAGNOSIS — Z12.31 ENCOUNTER FOR SCREENING MAMMOGRAM FOR BREAST CANCER: ICD-10-CM

## 2022-09-30 PROCEDURE — 77067 SCR MAMMO BI INCL CAD: CPT | Mod: TC | Performed by: RADIOLOGY

## 2022-10-02 LAB — NONINV COLON CA DNA+OCC BLD SCRN STL QL: NEGATIVE

## 2022-10-23 ENCOUNTER — HEALTH MAINTENANCE LETTER (OUTPATIENT)
Age: 47
End: 2022-10-23

## 2022-11-01 ENCOUNTER — OFFICE VISIT (OUTPATIENT)
Dept: DERMATOLOGY | Facility: CLINIC | Age: 47
End: 2022-11-01
Payer: COMMERCIAL

## 2022-11-01 VITALS — SYSTOLIC BLOOD PRESSURE: 99 MMHG | DIASTOLIC BLOOD PRESSURE: 66 MMHG | HEART RATE: 71 BPM

## 2022-11-01 DIAGNOSIS — C44.519 BASAL CELL CARCINOMA (BCC) OF CHEST: Primary | ICD-10-CM

## 2022-11-01 PROCEDURE — 11603 EXC TR-EXT MAL+MARG 2.1-3 CM: CPT | Performed by: DERMATOLOGY

## 2022-11-01 PROCEDURE — 88305 TISSUE EXAM BY PATHOLOGIST: CPT | Mod: TC | Performed by: DERMATOLOGY

## 2022-11-01 PROCEDURE — 88305 TISSUE EXAM BY PATHOLOGIST: CPT | Mod: 26 | Performed by: DERMATOLOGY

## 2022-11-01 PROCEDURE — 12032 INTMD RPR S/A/T/EXT 2.6-7.5: CPT | Performed by: DERMATOLOGY

## 2022-11-01 RX ORDER — AMOXICILLIN 500 MG/1
CAPSULE ORAL
COMMUNITY
Start: 2022-10-31 | End: 2024-07-10

## 2022-11-01 ASSESSMENT — PAIN SCALES - GENERAL: PAINLEVEL: NO PAIN (0)

## 2022-11-01 NOTE — NURSING NOTE
Dermatology Rooming Note    Dontae Weston's goals for this visit include:   Chief Complaint   Patient presents with     Derm Problem     Dontae is here today for an excision of a BCC on the left upper chest      Josep Valenzuela CMA

## 2022-11-01 NOTE — PROGRESS NOTES
[facility] Dermatologic Surgery Excision Note    Case #: 1  Date of Service:  Nov 1, 2022  Surgery: Wide local excision  Staff Surgeon: Matheus Reed MD  Fellow Surgeon: Mehdi Richards MD  Resident Surgeon: none  Nurse: Osiris Fu CMA    Tumor Type: BCC  Location: Left upper chest  Derm-Path Accession #: [id number]    Skin Lesion Size:  1.2 cm x 1.0 cm  Margin: 5 mm  Excision size: 2.2 cm x 2.0 cm  Level of Defect: Fat  Repair Type: Intermediate linear  Repair Size: 5.0 cm  Suture Material: 4-0 Monocryl    INDICATIONS:  The patient was scheduled for wide local excision of the skin lesion documented above. We discussed the principles of treatment and most likely complications including scarring, bleeding, infection, incomplete excision, wound dehiscence, pain, nerve damage, and recurrence. Informed consent was obtained and the patient underwent the procedure as follows:    PROCEDURE:  With the patient in a supine position, the lesion was outlined with the margin documented above.  The lesion and the necessary margin (excised diameter) was measured and documented as above.  An ellipse was designed around the lesion to conform to relaxed skin tension lines in an effort to minimize scarring and deformity.  The lesion and surrounding skin were prepped with chlorhexidine, draped, and anesthetized with lidocaine with epinephrine. Using a #15 blade, the skin was excised along premarked lines.  The level of the defect is documented as above.  Wound margins were undermined to limit functional deformity/impairment of adjacent structures. Bleeding vessels were controlled with electrocoagulation.  The dermis and subcutaneous tissue were closed with buried vertical mattress sutures using 4-0 Monocryl.  Epidermal approximation was meticulously refined with 4-0 Monocryl subcuticular sutures, resulting in a linear closure with little to no wound tension.  Blood loss was estimated to be less than 5 mL.  The area  was coated with petrolatum and covered with a non-adherent dressing followed by gauze and tape. Postoperative instructions were reviewed per protocol.  The patient left alert and fully oriented.    Follow-up for suture removal: Not applicable as only dissolving sutures used    Matheus Reed MD was immediately available for the entire surgery and was physicially present for the key portions of the procedure.    Mehdi Richards MD  Dermatology, PGY-5  Mohs surgery fellow    Scribe Disclosure:  IKalyn, am serving as a scribe to document services personally performed by Matheus Reed MD based on data collection and the provider's statements to me.       Attending attestation:  I was present for key elements of the procedure and immediately available for all other portions of the procedure.  I have reviewed the note and edited it as necessary.    Matheus Reed M.D.  Professor  Director of Dermatologic Surgery  Department of Dermatology  [facility]    Dermatology Surgery Clinic  [facility] and Surgery [address] [address]

## 2022-11-01 NOTE — LETTER
11/1/2022       RE: Dontae Weston  [address] [place] [address]     Dear Colleague,    Thank you for referring your patient, Dontae Weston, to the [facility]ERMATOLOG[facility] [place] at [facility]. Please see a copy of my visit note below.    [facility] Dermatologic Surgery Excision Note    Case #: 1  Date of Service:  Nov 1, 2022  Surgery: Wide local excision  Staff Surgeon: Matheus Reed MD  Fellow Surgeon: Mehdi Richards MD  Resident Surgeon: none  Nurse: Osiris Fu CMA    Tumor Type: BCC  Location: Left upper chest  Derm-Path Accession #: [id number]    Skin Lesion Size:  1.2 cm x 1.0 cm  Margin: 5 mm  Excision size: 2.2 cm x 2.0 cm  Level of Defect: Fat  Repair Type: Intermediate linear  Repair Size: 5.0 cm  Suture Material: 4-0 Monocryl    INDICATIONS:  The patient was scheduled for wide local excision of the skin lesion documented above. We discussed the principles of treatment and most likely complications including scarring, bleeding, infection, incomplete excision, wound dehiscence, pain, nerve damage, and recurrence. Informed consent was obtained and the patient underwent the procedure as follows:    PROCEDURE:  With the patient in a supine position, the lesion was outlined with the margin documented above.  The lesion and the necessary margin (excised diameter) was measured and documented as above.  An ellipse was designed around the lesion to conform to relaxed skin tension lines in an effort to minimize scarring and deformity.  The lesion and surrounding skin were prepped with chlorhexidine, draped, and anesthetized with lidocaine with epinephrine. Using a #15 blade, the skin was excised along premarked lines.  The level of the defect is documented as above.  Wound margins were undermined to limit functional deformity/impairment of adjacent structures. Bleeding vessels were controlled with  electrocoagulation.  The dermis and subcutaneous tissue were closed with buried vertical mattress sutures using 4-0 Monocryl.  Epidermal approximation was meticulously refined with 4-0 Monocryl subcuticular sutures, resulting in a linear closure with little to no wound tension.  Blood loss was estimated to be less than 5 mL.  The area was coated with petrolatum and covered with a non-adherent dressing followed by gauze and tape. Postoperative instructions were reviewed per protocol.  The patient left alert and fully oriented.    Follow-up for suture removal: Not applicable as only dissolving sutures used    Matheus Reed MD was immediately available for the entire surgery and was physicially present for the key portions of the procedure.    Mehdi Richards MD  Dermatology, PGY-5  Mohs surgery fellow    Scribe Disclosure:  IKalyn, am serving as a scribe to document services personally performed by Matheus Reed MD based on data collection and the provider's statements to me.       Attending attestation:  I was present for key elements of the procedure and immediately available for all other portions of the procedure.  I have reviewed the note and edited it as necessary.    Matheus Reed M.D.  Professor  Director of Dermatologic Surgery  Department of Dermatology  [facility]    Dermatology Surgery Clinic  [facility] and Surgery [place] [id number]

## 2022-11-02 ENCOUNTER — DOCUMENTATION ONLY (OUTPATIENT)
Dept: DERMATOLOGY | Facility: CLINIC | Age: 47
End: 2022-11-02
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
## 2022-11-03 LAB
PATH REPORT.COMMENTS IMP SPEC: NORMAL
PATH REPORT.COMMENTS IMP SPEC: NORMAL
PATH REPORT.FINAL DX SPEC: NORMAL
PATH REPORT.GROSS SPEC: NORMAL
PATH REPORT.MICROSCOPIC SPEC OTHER STN: NORMAL
PATH REPORT.RELEVANT HX SPEC: NORMAL

## 2023-03-28 ENCOUNTER — TELEPHONE (OUTPATIENT)
Dept: FAMILY MEDICINE | Facility: CLINIC | Age: 48
End: 2023-03-28
Payer: COMMERCIAL

## 2023-03-28 NOTE — TELEPHONE ENCOUNTER
Patient Quality Outreach    Patient is due for the following:       Topic Date Due     Hepatitis B Vaccine (1 of 3 - 3-dose series) Never done     COVID-19 Vaccine (1) Never done     Pneumococcal Vaccine (1 - PCV) Never done     Flu Vaccine (1) Never done       Next Steps:   Schedule a nurse only visit for Immunizations    Type of outreach:    Sent [employer] message.      Questions for provider review:    None     EMY RODRIGUEZ MA

## 2023-08-13 ENCOUNTER — OFFICE VISIT (OUTPATIENT)
Dept: URGENT CARE | Facility: URGENT CARE | Age: 48
End: 2023-08-13
Payer: COMMERCIAL

## 2023-08-13 VITALS
WEIGHT: 95.4 LBS | BODY MASS INDEX: 16.63 KG/M2 | OXYGEN SATURATION: 96 % | SYSTOLIC BLOOD PRESSURE: 133 MMHG | DIASTOLIC BLOOD PRESSURE: 77 MMHG | HEART RATE: 82 BPM | TEMPERATURE: 97.2 F

## 2023-08-13 DIAGNOSIS — S14.109A INJURY OF CERVICAL SPINE, INITIAL ENCOUNTER (H): Primary | ICD-10-CM

## 2023-08-13 DIAGNOSIS — M54.12 CERVICAL RADICULOPATHY: ICD-10-CM
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
## 2023-08-13 PROCEDURE — 99214 OFFICE O/P EST MOD 30 MIN: CPT | Performed by: FAMILY MEDICINE

## 2023-08-13 ASSESSMENT — PAIN SCALES - GENERAL: PAINLEVEL: SEVERE PAIN (7)
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
## 2023-08-14 ENCOUNTER — NURSE TRIAGE (OUTPATIENT)
Dept: FAMILY MEDICINE | Facility: CLINIC | Age: 48
End: 2023-08-14
Payer: COMMERCIAL
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
## 2023-08-16 ENCOUNTER — OFFICE VISIT (OUTPATIENT)
Dept: FAMILY MEDICINE | Facility: CLINIC | Age: 48
End: 2023-08-16
Payer: COMMERCIAL

## 2023-08-16 VITALS
BODY MASS INDEX: 16.56 KG/M2 | RESPIRATION RATE: 14 BRPM | SYSTOLIC BLOOD PRESSURE: 111 MMHG | HEIGHT: 64 IN | WEIGHT: 97 LBS | OXYGEN SATURATION: 99 % | TEMPERATURE: 97.9 F | DIASTOLIC BLOOD PRESSURE: 69 MMHG | HEART RATE: 71 BPM

## 2023-08-16 DIAGNOSIS — S16.1XXA STRAIN OF NECK MUSCLE, INITIAL ENCOUNTER: Primary | ICD-10-CM
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
## 2023-08-16 PROCEDURE — 99213 OFFICE O/P EST LOW 20 MIN: CPT | Performed by: PHYSICIAN ASSISTANT

## 2023-08-16 RX ORDER — LIDOCAINE 50 MG/G
PATCH TOPICAL
COMMUNITY
Start: 2023-08-13 | End: 2024-07-10

## 2023-08-16 RX ORDER — METHOCARBAMOL 750 MG/1
750 TABLET, FILM COATED ORAL
COMMUNITY
Start: 2023-08-13 | End: 2023-08-18

## 2023-08-16 RX ORDER — HYDROCODONE BITARTRATE AND ACETAMINOPHEN 5; 325 MG/1; MG/1
1 TABLET ORAL EVERY 6 HOURS PRN
Qty: 8 TABLET | Refills: 0 | Status: SHIPPED | OUTPATIENT
Start: 2023-08-16 | End: 2023-08-18

## 2023-08-16 RX ORDER — CYCLOBENZAPRINE HCL 10 MG
10 TABLET ORAL 3 TIMES DAILY PRN
Qty: 30 TABLET | Refills: 0 | Status: SHIPPED | OUTPATIENT
Start: 2023-08-16 | End: 2024-07-10

## 2023-08-16 ASSESSMENT — PAIN SCALES - GENERAL: PAINLEVEL: NO PAIN (0)
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
## 2023-08-20 ENCOUNTER — MYC MEDICAL ADVICE (OUTPATIENT)
Dept: FAMILY MEDICINE | Facility: CLINIC | Age: 48
End: 2023-08-20
Payer: COMMERCIAL

## 2023-11-05 ENCOUNTER — HEALTH MAINTENANCE LETTER (OUTPATIENT)
Age: 48
End: 2023-11-05

## 2023-12-13 ENCOUNTER — TELEPHONE (OUTPATIENT)
Dept: FAMILY MEDICINE | Facility: CLINIC | Age: 48
End: 2023-12-13
Payer: COMMERCIAL

## 2023-12-13 NOTE — TELEPHONE ENCOUNTER
Patient Quality Outreach    Patient is due for the following:   Breast Cancer Screening - Mammogram  Physical Preventive Adult Physical      Topic Date Due    Hepatitis B Vaccine (1 of 3 - 3-dose series) Never done    COVID-19 Vaccine (1) Never done    Hepatitis A Vaccine (1 of 2 - Risk 2-dose series) Never done    Flu Vaccine (1) Never done       Next Steps:   Schedule a Adult Preventative    Type of outreach:    Sent [employer] message.      Questions for provider review:               Peggy Aguilar MA

## 2024-01-14 ENCOUNTER — HEALTH MAINTENANCE LETTER (OUTPATIENT)
Age: 49
End: 2024-01-14

## 2024-03-13 ENCOUNTER — TELEPHONE (OUTPATIENT)
Dept: FAMILY MEDICINE | Facility: CLINIC | Age: 49
End: 2024-03-13
Payer: COMMERCIAL

## 2024-03-13 NOTE — TELEPHONE ENCOUNTER
Patient Quality Outreach    Patient is due for the following:   Physical Preventive Adult Physical      Topic Date Due    Hepatitis A Vaccine (1 of 2 - Risk 2-dose series) Never done    Hepatitis B Vaccine (1 of 3 - 19+ 3-dose series) Never done    Flu Vaccine (1) Never done    COVID-19 Vaccine (1 - 2023-24 season) Never done       Next Steps:   Schedule a Adult Preventative    Type of outreach:    Sent [facility] message.      Questions for provider review:    None           EMY RODRIGUEZ MA

## 2024-07-02 ENCOUNTER — TELEPHONE (OUTPATIENT)
Dept: FAMILY MEDICINE | Facility: CLINIC | Age: 49
End: 2024-07-02
Payer: COMMERCIAL
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
## 2024-07-02 NOTE — TELEPHONE ENCOUNTER
"Pt calling in, transferred to RN. Provider does not have any available appointments for tomorrow. Pt states \"this is super important and I need him to call me\". Advised pt that provider typically does not make phone calls. Pt stated she needs this taken care of ASAP.     Routing to provider - Please advise on next steps and route to TC team.     Thank you - Betty Arriaga, PARESHN, RN    "

## 2024-07-02 NOTE — TELEPHONE ENCOUNTER
Forms/Letter Request    Type of form/letter: Work    Have you been seen for this request: No    Do we have the form/letter: Yes: Patient needs work note for 06/26/2024-07/05/2024.     When is form/letter needed by: ASAP    How would you like the form/letter returned:  Patient would like the letter emailed to her  [email or web address]    Patient Notified form requests are processed in 3-5 business days:Yes    Could we send this information to you in [employer] or would you prefer to receive a phone call?:   Patient would prefer a phone call   Okay to leave a detailed message?: Yes at Cell number on file:    Telephone Information:   Mobile [phone]

## 2024-07-03 ENCOUNTER — TELEPHONE (OUTPATIENT)
Dept: FAMILY MEDICINE | Facility: CLINIC | Age: 49
End: 2024-07-03

## 2024-07-03 NOTE — LETTER
July 3, 2024        Dontae Weston  [address] [facility] [address]           To Whom It May Concern:     Dontae Weston had a telephone visit today.  She missed work from 6/26 and will not return to work until 7/9/2024.      Sincerely,              Junior Crane MD

## 2024-07-03 NOTE — LETTER
July 11, 2024      Dontae Weston  [address] [facility] [address]        To Whom It May Concern:    Dontae Weston was seen in our clinic 7/10/2014. She may return to work without restrictions in one month.      Sincerely,        Junior Crane MD

## 2024-07-03 NOTE — LETTER
July 3, 2024      Dontae Weston  [address] [facility] [address]        To Whom It May Concern:    Dontae Weston had a telephone visit today.  She missed work from 6/26 and will not return to work until 9/9/2024.     Sincerely,             Junior Crane MD

## 2024-07-03 NOTE — TELEPHONE ENCOUNTER
Reason for Call:  Other call back and prescription    Detailed comments: Leeann delayed prescription due to insurance problems, recommended patient to call insurance to verify. She did not call, and is requesting  clinic to call on her behalf. Patient asking for a generic prescription is needed so. Please reach out to further assist.    Phone Number Patient can be reached at: Cell number on file:    Telephone Information:   Mobile [phone]       Best Time: anytime    Can we leave a detailed message on this number? YES    Call taken on 7/3/2024 at 4:42 PM by Gaviota Nichole

## 2024-07-03 NOTE — TELEPHONE ENCOUNTER
Patient called back. [facility] She would like you to change the note to say that she can be off from 6/26/24-7/9/24. Please advise if you can do this.Narcisa Rodriguez [facility]

## 2024-07-03 NOTE — TELEPHONE ENCOUNTER
Called patient back due to requested call back regarding prescription for doxepin (SILENOR) 3 MG tablet for insomnia.     Let patient know the prior auth was initiated today and can take up to 10-14 business days to hear a response from the PA department. Patient upset, and advised patient to call her insurance. Patient requesting care team call her insurance. Informed patient the care team does not call insurance companies, hence the PA initiation. If patient calls insurance, and finds a medication similar to prescribed med, can call or send the care team a message to send to PCP    Nicky Simmons RN

## 2024-07-03 NOTE — TELEPHONE ENCOUNTER
Prior Authorization Retail Medication Request    Medication/Dose: doxepin (SILENOR) 3 MG tablet  Diagnosis and ICD code (if different than what is on RX):    Primary insomnia [F51.01]     New/renewal/insurance change PA/secondary ins. PA:  Previously Tried and Failed:    Rationale:      Insurance   Primary: [phone]  Insurance ID:  [id number]    Secondary (if applicable):  Insurance ID:      Pharmacy Information (if different than what is on RX)  Name:  [facility]  Phone:  [phone]  Fax:[phone]

## 2024-07-03 NOTE — TELEPHONE ENCOUNTER
Dr Crane created new note    Left message that new note was done. I asked her to call back if she needs the note that Dr Crane signed.Narcisa Rodriguez [facility]

## 2024-07-09 ENCOUNTER — TELEPHONE (OUTPATIENT)
Dept: SCHEDULING | Facility: CLINIC | Age: 49
End: 2024-07-09
Payer: COMMERCIAL

## 2024-07-09 NOTE — TELEPHONE ENCOUNTER
Forms/Letter Request    Type of form/letter: Work    Have you been seen for this request: Yes     Do we have the form/letter: Yes: Work note needing to be extended per pt, from 7/9-7/16    When is form/letter needed by: ASAP    How would you like the form/letter returned: Place orders within Epic    Patient Notified form requests are processed in 3-5 business days:Yes    Could we send this information to you in [employer] or would you prefer to receive a phone call?:   Patient would prefer a phone call   Okay to leave a detailed message?: Yes at Cell number on file:    Telephone Information:   Mobile [phone]

## 2024-07-10 ENCOUNTER — TELEPHONE (OUTPATIENT)
Dept: FAMILY MEDICINE | Facility: CLINIC | Age: 49
End: 2024-07-10

## 2024-07-10 ENCOUNTER — OFFICE VISIT (OUTPATIENT)
Dept: FAMILY MEDICINE | Facility: CLINIC | Age: 49
End: 2024-07-10
Payer: COMMERCIAL

## 2024-07-10 VITALS
HEART RATE: 122 BPM | SYSTOLIC BLOOD PRESSURE: 122 MMHG | TEMPERATURE: 98.4 F | DIASTOLIC BLOOD PRESSURE: 78 MMHG | RESPIRATION RATE: 19 BRPM | OXYGEN SATURATION: 97 %

## 2024-07-10 DIAGNOSIS — F33.42 RECURRENT MAJOR DEPRESSIVE DISORDER, IN FULL REMISSION (H): Primary | ICD-10-CM

## 2024-07-10 PROCEDURE — 99214 OFFICE O/P EST MOD 30 MIN: CPT | Performed by: FAMILY MEDICINE

## 2024-07-10 RX ORDER — ESCITALOPRAM OXALATE 10 MG/1
10 TABLET ORAL DAILY
Qty: 30 TABLET | Refills: 0 | Status: SHIPPED | OUTPATIENT
Start: 2024-07-10 | End: 2024-08-07

## 2024-07-10 ASSESSMENT — PAIN SCALES - GENERAL: PAINLEVEL: NO PAIN (0)

## 2024-07-10 NOTE — TELEPHONE ENCOUNTER
Per Dr Crane, patient needs an appointment . Scheduled patient for 3:10 today.Narcisa Rodriguez [facility]

## 2024-07-10 NOTE — PROGRESS NOTES
Subjective   Dontae is a 49 year old, presenting for the following health issues:  Patient Request for Note/Letter        7/10/2024     8:42 AM   Additional Questions   Roomed by Gio JONES LPN   Accompanied by Self         7/10/2024   Forms   Any forms needing to be completed Yes          7/10/2024     8:42 AM   Patient Reported Additional Medications   Patient reports taking the following new medications None     HPI   SUBJECTIVE:  49 year old.The patient has a history of depression/ anxiety.  This has been going on for 1 month.  Inciting incidents that have caused this are none.  Associated symptoms include insomnia,  difficulty in thinking. Family history none.  OBJECTIVE:  no apparent distress  /78   Pulse (!) 122   Temp 98.4  F (36.9  C) (Tympanic)   Resp 19   SpO2 97%    MENTAL STATUS EXAM  Appearance: appropriate  Attitude: cooperative  Behavior: normal  Eyre Contact: normal  Speech: was slow and sometimes slurred.  Orientation: oreinted to person , place, time and situation  Mood:  admits tearful  Affect: Mood Congruient  Thought Process: clear  Suicidal Ideation: reports thoughts, no intention  Hallucination: no   Thought process was slow  During exam was often tearful. Was in denial about improving and I felt she was confused in understanding herself and situation.      ICD-10-CM    1. Recurrent major depressive disorder, in full remission (H24)  F33.42 escitalopram (LEXAPRO) 10 MG tablet     amitriptyline (ELAVIL) 25 MG tablet       PLAN:Recheck one month    Signed Electronically by: Junior Crane MD

## 2024-07-10 NOTE — TELEPHONE ENCOUNTER
Provider: Patient is requesting a note to extend her leave from work. She stated that this was addressed at her appointment today. She would like this sent to her MyChart. Thank you. Manisha Braun R.N.    Ok to leave a message with the provider's response.

## 2024-07-11 ENCOUNTER — MYC MEDICAL ADVICE (OUTPATIENT)
Dept: FAMILY MEDICINE | Facility: CLINIC | Age: 49
End: 2024-07-11
Payer: COMMERCIAL

## 2024-07-11 NOTE — TELEPHONE ENCOUNTER
PRIOR AUTHORIZATION DENIED    Medication: doxepin (SILENOR) 3 MG tablet-PA DENIED     Denial Date: 7/10/2024    Denial Rational:           Appeal Information:

## 2024-07-15 ENCOUNTER — TELEPHONE (OUTPATIENT)
Dept: FAMILY MEDICINE | Facility: CLINIC | Age: 49
End: 2024-07-15
Payer: COMMERCIAL

## 2024-07-15 NOTE — TELEPHONE ENCOUNTER
Forms/Letter Request    Type of form/letter: OTHER: Attending Provider Statement       Do we have the form/letter: Yes: was faxed in    Who is the form from? Employer (if other please explain)    Where did/will the form come from? form was faxed in    When is form/letter needed by: ASAP    How would you like the form/letter returned: Fax : [phone]    Narcisa FRANCISCO [facility]

## 2024-07-17 NOTE — TELEPHONE ENCOUNTER
Dr Crane completed the forms. It has been faxed to [place] @ [phone].Narcisa Rodriguez [facility]    Placed a copy to be scanned into the chart and a copy was placed in TC form folder.

## 2024-08-09 ENCOUNTER — TELEPHONE (OUTPATIENT)
Dept: FAMILY MEDICINE | Facility: CLINIC | Age: 49
End: 2024-08-09
Payer: COMMERCIAL

## 2024-08-09 NOTE — TELEPHONE ENCOUNTER
I am unable to do a telephone visit until Wednesday. She will need to be added to the schedule unless there is a cancellation. Please, use a am slot.

## 2024-08-09 NOTE — TELEPHONE ENCOUNTER
FYI - Status Update    Who is Calling: patient    Update: Patient requesting a call from provider to discuss a extension on her leave of absence - she did not want to give me any more information but only wanted to speak to her provider. She stated it is urgent. I did explain that Dr. Crane is out of office but she insisted that I still send the message.     Does caller want a call/response back: Yes     Could we send this information to you in [employer] or would you prefer to receive a phone call?:   Patient would prefer a phone call     Okay to leave a detailed message?: Yes at Home number on file [phone] (home)

## 2024-08-12 NOTE — TELEPHONE ENCOUNTER
Left message for patient to call back, can add to Dr Crane's schedule for 8/14/24 in the AM.Narcisa Rodriguez [facility]

## 2024-08-13 ENCOUNTER — TELEPHONE (OUTPATIENT)
Dept: FAMILY MEDICINE | Facility: CLINIC | Age: 49
End: 2024-08-13
Payer: COMMERCIAL

## 2024-08-13 NOTE — TELEPHONE ENCOUNTER
Reason for Call:  Form, our goal is to have forms completed with 72 hours, however, some forms may require a visit or additional information.    Type of letter, form or note:  disability    Who is the form from?: [employer]    Where did the form come from: form was faxed in    What clinic location was the form placed at?: [place]    Where the form was placed:  Dr. Crane's Box/Folder    What number is listed as a contact on the form?: [phone]       Additional comments: Patient has office visit with PCP on 8/21/24.    Call taken on 8/13/2024 at 3:28 PM by Modesta Kurtz MA

## 2024-08-13 NOTE — TELEPHONE ENCOUNTER
FYI - Status Update    Who is Calling: patient    Update: Patient returned clinic call.  She is currently in detox and will not be released until Wednesday evening, August 14, 2024 at 8:00 pm.    Scheduled patient for office visit with provider on 8/21/24 at 11:30 am to complete extended Leave of Absence forms for patient's employer  .  Modesta PELAEZ    [facility]

## 2024-08-15 ENCOUNTER — TELEPHONE (OUTPATIENT)
Dept: FAMILY MEDICINE | Facility: CLINIC | Age: 49
End: 2024-08-15
Payer: COMMERCIAL

## 2024-08-15 NOTE — TELEPHONE ENCOUNTER
Forms/Letter Request    Type of form/letter: FMLA - Unknown      Do we have the form/letter: Yes:     Who is the form from? Patient    Where did/will the form come from? Patient or family brought in       When is form/letter needed by: whenever     How would you like the form/letter returned:     Patient Notified form requests are processed in 5-7 business days:Yes    Could we send this information to you in [employer] or would you prefer to receive a phone call?:   You like a call [phone]

## 2024-08-15 NOTE — TELEPHONE ENCOUNTER
Form placed in your basket. Patient is scheduled with you on 8/21/24.Narcisa Rodriguez [facility]

## 2024-08-21 ENCOUNTER — TELEPHONE (OUTPATIENT)
Dept: FAMILY MEDICINE | Facility: CLINIC | Age: 49
End: 2024-08-21

## 2024-08-21 NOTE — TELEPHONE ENCOUNTER
Patient wants to know if she can do the visit with you over the phone. She is in detox and cannot come in.Narcisa FRANCISCO [facility]

## 2024-08-21 NOTE — TELEPHONE ENCOUNTER
Ok elena Crane to do a telephone visit. Called and informed patient that a telephone visit was OK.Narcisa Rodriguez [facility]

## 2024-08-21 NOTE — TELEPHONE ENCOUNTER
Faxed completed form to [place] @ [phone].Narcisa FRANCISCO [facility]    Sent a copy to be scanned into the chart and a copy placed in TC form folder.

## 2024-08-23 ENCOUNTER — TELEPHONE (OUTPATIENT)
Dept: FAMILY MEDICINE | Facility: CLINIC | Age: 49
End: 2024-08-23
Payer: COMMERCIAL

## 2024-08-23 NOTE — TELEPHONE ENCOUNTER
Reason for Call:  Appointment Request    Patient requesting this type of appt:  Hospital/ED Follow-Up     Requested provider: Junior Crane    Reason patient unable to be scheduled: Not within requested timeframe    When does patient want to be seen/preferred time: 1-2 weeks on or before September 11th.     If scheduling on September 11, patient will need to be seen no later than noon; she starts work at 1:30 pm.     Comments: None     Could we send this information to you in [employer] or would you prefer to receive a phone call?:   Patient would prefer a phone call   Okay to leave a detailed message?: No at Cell number on file:    Telephone Information:    [phone] ext [phone] - if pt discharged, call cell number below     Mobile [phone]       Call taken on 8/23/2024 at 10:03 AM by Charissa Fraser

## 2024-08-23 NOTE — TELEPHONE ENCOUNTER
Medication Question or Refill        What medication are you calling about (include dose and sig)?: Naltrexone, Gabapentin and Propanolol     Preferred Pharmacy:   [id number] - [facility] [address]  [address] [address]  Phone: [phone] Fax: [phone]      Controlled Substance Agreement on file:   CSA -- Patient Level:    CSA: None found at the patient level.       Who prescribed the medication?: N/A     Do you need a refill? No    When did you use the medication last? Never     Patient offered an appointment? No    Do you have any questions or concerns?      Could we send this information to you in [employer] or would you prefer to receive a phone call?:   Patient would prefer a phone call   Okay to leave a detailed message?: No at Other phone number:      [phone] ext [phone], ask for patient - Patient may be getting discharged tomorrow

## 2024-08-23 NOTE — TELEPHONE ENCOUNTER
Medications requested are not on medication list.   Are these the correct medications patient wants refilled?    Diane Chaudhari BSN, RN

## 2024-08-23 NOTE — TELEPHONE ENCOUNTER
"Attempted to contact patient, unsuccessful and unable to leave a voicemail message.  Patient is actively in Detox at [facility].  Patient provided contact number to her at recovery [place], called and no one answered and \"mail box is full\" so was unable to leave a message for anyone to have patient return call back to clinic.  Patient had virtual visit with provider while was still in Detox, on 8/21/24.    Will need to attempt call again at later time. [phone] ext [phone], ask for patient .    When call to patient, need to clarify the medication's she is requesting to have refilled. Not on current med list.  Are these medications she is being given in Detox? (Pt requesting Naltrexone, Gabapentin, Propranolol)  What are the doses and frequency?   Patient may be being discharged to home tomorrow (Saturday).      Madison Lara RN  Clinical Triage/Primary Care  [facility]    "

## 2024-08-26 ENCOUNTER — TELEPHONE (OUTPATIENT)
Dept: FAMILY MEDICINE | Facility: CLINIC | Age: 49
End: 2024-08-26
Payer: COMMERCIAL

## 2024-08-26 NOTE — TELEPHONE ENCOUNTER
Patient called back, appointment set up with Dr Crane for 9/4/24.Narcisa Rodriguez [facility]

## 2024-08-26 NOTE — TELEPHONE ENCOUNTER
Patient returning a call for the message below.  Patient warm transferred to TC team to support her needs.  Thank you. Manisha Braun R.N.

## 2024-08-26 NOTE — TELEPHONE ENCOUNTER
I need more information and would like to talk to her.  Can we set up a telephone call for Wednesday am?

## 2024-08-26 NOTE — TELEPHONE ENCOUNTER
New Medication Request    Contacts       Contact Date/Time Type Contact Phone/Fax    08/26/2024 07:58 AM CDT Phone (Incoming) Dontae Weston DINA (Self) [phone] (M)            What medication are you requesting?: Naltrexone and gabapentin (?50 mg)    Reason for medication request: Was on gabapentin in detox. Was told she should have Naltrexone    Have you taken this medication before?: Yes: in detox    Controlled Substance Agreement on file:   CSA -- Patient Level:    CSA: None found at the patient level.         Patient offered an appointment? No-she is scheduled with Dr Crane on 9/4/24    Preferred Pharmacy:  [id number] [place] [address] [address] [facility] AT [address] [place] [address] [address] [address]  Phone: [phone] Fax: [phone]      Could we send this information to you in [facility] or would you prefer to receive a phone call?:   Patient would prefer a phone call   Okay to leave a detailed message?: Yes at Cell number on file:    Telephone Information:   Mobile [phone]     Narcisa FRANCISCO [facility]

## 2024-08-28 ENCOUNTER — TELEPHONE (OUTPATIENT)
Dept: FAMILY MEDICINE | Facility: CLINIC | Age: 49
End: 2024-08-28

## 2024-08-28 NOTE — TELEPHONE ENCOUNTER
I am sorry, I did not realized I scheduled her in-person and not a telephone visit. Can you still call her today?Narcisa FRANCISCO [facility]

## 2024-08-28 NOTE — TELEPHONE ENCOUNTER
Patient calling clinic, was told to do telehealth appointment at 10 am. Called at 1015 am because she hadn't heard from provider yet. Chart review confirms that she was ok to do appointment at telehealth, see telephone encounter from 8/26/24, appointment desk does show patient scheduled for in person visit. Provider notified. Teams message sent to . Routing to Team, please assist with rescheduling patient if appropriate. Tino Sanabria, RN, BSN

## 2024-08-29 NOTE — TELEPHONE ENCOUNTER
"Patient calling regarding conversation yesterday. Keeps stating she \"needs to talk to Dr. Crane only\" and that she is wanting to extend leave of absence and talk about possibility of in person treatment. Told patient I would route to PCP to figure out next steps        [address]      "

## 2024-08-29 NOTE — TELEPHONE ENCOUNTER
I can not admit her to a chem dep progam.  She would have to be evaluated.  This could be done at [facility].  She could also ask her therapist for direction.

## 2024-08-30 NOTE — TELEPHONE ENCOUNTER
Spoke with pt and relayed providers message. Patient stated she is going to go back to work on Oct 11th and speak to her therapist at her next appt about treatment if she still wants to go that route.     Awilda ALANIS,    [facility]

## 2024-09-09 ENCOUNTER — MYC MEDICAL ADVICE (OUTPATIENT)
Dept: FAMILY MEDICINE | Facility: CLINIC | Age: 49
End: 2024-09-09
Payer: COMMERCIAL

## 2024-09-10 ENCOUNTER — MYC MEDICAL ADVICE (OUTPATIENT)
Dept: FAMILY MEDICINE | Facility: CLINIC | Age: 49
End: 2024-09-10
Payer: COMMERCIAL

## 2024-09-10 NOTE — TELEPHONE ENCOUNTER
Per Dr Crane, telephone visit Ok for tomorrow. I left a message for patient to call back. I also sent a Tile message.Narcisa Rodriguez [facility]

## 2024-09-10 NOTE — TELEPHONE ENCOUNTER
I scheduled her at 3:00 with you, but can you add her on in the morning?Narcisa FRANCISCO [facility]

## 2024-09-11 ENCOUNTER — VIRTUAL VISIT (OUTPATIENT)
Dept: FAMILY MEDICINE | Facility: CLINIC | Age: 49
End: 2024-09-11
Payer: COMMERCIAL

## 2024-09-11 DIAGNOSIS — F19.20 CHEMICAL DEPENDENCY (H): Primary | ICD-10-CM

## 2024-09-11 DIAGNOSIS — F41.9 ANXIETY: ICD-10-CM

## 2024-09-11 PROCEDURE — 99442 PR PHYSICIAN TELEPHONE EVALUATION 11-20 MIN: CPT | Mod: 93 | Performed by: FAMILY MEDICINE

## 2024-09-11 ASSESSMENT — PATIENT HEALTH QUESTIONNAIRE - PHQ9
SUM OF ALL RESPONSES TO PHQ QUESTIONS 1-9: 14
10. IF YOU CHECKED OFF ANY PROBLEMS, HOW DIFFICULT HAVE THESE PROBLEMS MADE IT FOR YOU TO DO YOUR WORK, TAKE CARE OF THINGS AT HOME, OR GET ALONG WITH OTHER PEOPLE: EXTREMELY DIFFICULT
SUM OF ALL RESPONSES TO PHQ QUESTIONS 1-9: 14

## 2024-09-11 NOTE — PROGRESS NOTES
Dontae is a 49 year old who is being evaluated via a billable telephone visit.    What phone number would you like to be contacted at? [phone]  How would you like to obtain your AVS? MyChart  Originating Location (pt. Location): Home    Distant Location (provider location):  On-site      ICD-10-CM    1. Chemical dependency (H)  F19.20       2. Anxiety  F41.9        PLAN:continue with in treatment program     Subjective   oDntae is a 49 year old, presenting for the following health issues:  Forms    History of Present Illness       Reason for visit:  Paperwork   She is taking medications regularly.   Patient failed sobriety and now is at [facility] in treatment for chemical dependency  she has only been there Tfor today.  She wants to go home but know this is beest for her to stay sober.    Objective           Vitals:  No vitals were obtained today due to virtual visit.    Physical Exam   General: Alert and no distress //Respiratory: No audible wheeze, cough, or shortness of breath // Psychiatric:  Appropriate affect, tone, and pace of words  Did not have slurrd speech.  She claims that the last time she went to detox she had tremors and low blood pressure and almost went to the   ED.          Phone call duration: Over  half of theTotal time 25 minutes spent in cordination care. Discussed diagnoses, prognoses and treatment.  minutes  Signed Electronically by: Junior Crane MD

## 2024-09-12 NOTE — TELEPHONE ENCOUNTER
Faxed completed form to [place] @ [phone]. Sent a copy to be scanned into the chart and placed a copy in the TC form folder.Narcisa Rodriguez [facility]

## 2024-12-22 ENCOUNTER — HEALTH MAINTENANCE LETTER (OUTPATIENT)
Age: 49
End: 2024-12-22

## 2025-02-08 ENCOUNTER — TELEPHONE (OUTPATIENT)
Dept: FAMILY MEDICINE | Facility: CLINIC | Age: 50
End: 2025-02-08

## 2025-02-08 NOTE — TELEPHONE ENCOUNTER
Forms/Letter Request    Type of form/letter: Work    Have you been seen for this request: Yes hospital stay    Do we have the form/letter: No    When is form/letter needed by: asap    How would you like the form/letter returned:     Patient Notified form requests are processed in 3-5 business days:Yes    Could we send this information to you in [employer] or would you prefer to receive a phone call?:   Patient would prefer a phone call   Okay to leave a detailed message?: Yes at Home number on file [phone] (home)

## 2025-02-10 NOTE — TELEPHONE ENCOUNTER
Patient called, she states she needs a letter, she does not have a form. She would like the letter to excuse her from work from February 4th to February 8th due to medical reasons.    Fred ARMENDARIZ  Patient Registration/  [phone]

## 2025-02-10 NOTE — TELEPHONE ENCOUNTER
Left message for patient to call back. I need to know if she has a form that needs to be filled out, or just needs a letter, and what the letter needs to say.Narcisa FRANCISCO [facility]

## 2025-02-11 ENCOUNTER — TELEPHONE (OUTPATIENT)
Dept: FAMILY MEDICINE | Facility: CLINIC | Age: 50
End: 2025-02-11

## 2025-02-11 ENCOUNTER — TELEPHONE (OUTPATIENT)
Dept: FAMILY MEDICINE | Facility: CLINIC | Age: 50
End: 2025-02-11
Payer: COMMERCIAL

## 2025-02-11 NOTE — TELEPHONE ENCOUNTER
"Called and spoke to patient. She said she had a \"mental health crisis\" and was in detox and at the hospital. She said she has a lot of stressors and and money issues. She said she made a bad choice.She does not want that on the note though..Narcisa Rodriguez [facility]    "

## 2025-02-11 NOTE — TELEPHONE ENCOUNTER
Pt returning call to the clinic to ask about note and speaking with the provider. Advised pt that provider is not in the office until tomorrow. Pt stating that she needs a note excusing her from work through today. Advised that message has been sent to provider. Offered visit with pcp on 2/19, pt declined stating that she needed to be seen today. Advised pt that there are no remaining appointments for today. Pt slurring words and asking repetitive questions for work note. Pt requesting that this message be sent to provider high priority. She is also requesting that Dr. Crane call her this evening. She stated that he has called her on days other than Wednesdays before. Advised pt I would send the message to provider high priority as requested.     Thank you - Betty Arriaga, PARESHN, RN

## 2025-02-11 NOTE — TELEPHONE ENCOUNTER
Left message on answering machine for patient to call back to [phone].  Raisa Rehman BSN, RN

## 2025-02-11 NOTE — TELEPHONE ENCOUNTER
I did not see her nor do I see that she was seen by anyone.  What are the medical reasons for missing work?

## 2025-02-11 NOTE — TELEPHONE ENCOUNTER
General Call    Contacts       Contact Date/Time Type Contact Phone/Fax    02/11/2025 02:07 PM CST Phone (Incoming) Dontae Weston (Self) [phone] (M)          Reason for Call:  Call Back     What are your questions or concerns:  would like PCP nurse to call her back. Offered virtual visit and the 19th wasn't soon enough. She says it's urgent but not 911 urgent      Could we send this information to you in [employer] or would you prefer to receive a phone call?:   Patient would prefer a phone call   Okay to leave a detailed message?: Yes at Cell number on file:    Telephone Information:   Mobile [phone]

## 2025-02-11 NOTE — TELEPHONE ENCOUNTER
General Call    Contacts       Contact Date/Time Type Contact Phone/Fax    02/11/2025 02:04 PM CST Phone (Incoming) Dontae Weston (Self) [phone] (M)          Reason for Call:  Call Back     What are your questions or concerns:  wants only PCP to call her back     Could we send this information to you in [employer] or would you prefer to receive a phone call?:   Patient would prefer a phone call   Okay to leave a detailed message?: Yes at Cell number on file:    Telephone Information:   Mobile [phone]

## 2025-02-11 NOTE — LETTER
February 12, 2025      Dontae Weston  [address] [facility] [address]        To Whom It May Concern:    Dontae Weston was unable to work 2/4 through the 13th because of mental and medical issues.        Sincerely,        Junior Crane MD    Electronically signed

## 2025-02-12 ENCOUNTER — MYC MEDICAL ADVICE (OUTPATIENT)
Dept: FAMILY MEDICINE | Facility: CLINIC | Age: 50
End: 2025-02-12

## 2025-02-12 NOTE — TELEPHONE ENCOUNTER
Dontae called back- stating that she just missed a call from you.  Can you try calling her again when you have a moment?  # [phone]    Thank you,  Kimberly HERNDON    [phone]

## 2025-02-12 NOTE — TELEPHONE ENCOUNTER
Patient was unable to work 2/4-2/13 because of stress at home [facility].
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
## 2025-02-18 NOTE — TELEPHONE ENCOUNTER
Pt calling about the letter Dr. Crane wrote her last week.  She needs a new note with dates of 2/4 through  2/18    She had a mental health thing happen and would like today off also so would like extension to 2/18.  Raisa YODERN, RN

## 2025-02-19 ENCOUNTER — MYC MEDICAL ADVICE (OUTPATIENT)
Dept: FAMILY MEDICINE | Facility: CLINIC | Age: 50
End: 2025-02-19

## 2025-02-19 ENCOUNTER — VIRTUAL VISIT (OUTPATIENT)
Dept: FAMILY MEDICINE | Facility: CLINIC | Age: 50
End: 2025-02-19
Payer: COMMERCIAL

## 2025-02-19 DIAGNOSIS — F41.9 ANXIETY: Primary | ICD-10-CM

## 2025-02-19 PROCEDURE — 98006 SYNCH AUDIO-VIDEO EST MOD 30: CPT | Performed by: FAMILY MEDICINE

## 2025-02-19 RX ORDER — BUSPIRONE HYDROCHLORIDE 5 MG/1
TABLET ORAL
Qty: 90 TABLET | Refills: 1 | Status: SHIPPED | OUTPATIENT
Start: 2025-02-19

## 2025-02-19 ASSESSMENT — PATIENT HEALTH QUESTIONNAIRE - PHQ9
10. IF YOU CHECKED OFF ANY PROBLEMS, HOW DIFFICULT HAVE THESE PROBLEMS MADE IT FOR YOU TO DO YOUR WORK, TAKE CARE OF THINGS AT HOME, OR GET ALONG WITH OTHER PEOPLE: EXTREMELY DIFFICULT
SUM OF ALL RESPONSES TO PHQ QUESTIONS 1-9: 15
SUM OF ALL RESPONSES TO PHQ QUESTIONS 1-9: 15

## 2025-02-19 NOTE — PROGRESS NOTES
Dontae is a 50 year old who is being evaluated via a billable video visit.    How would you like to obtain your AVS? MyChart  If the video visit is dropped, the invitation should be resent by: Text to cell phone: [phone]  Will anyone else be joining your video visit? No          Subjective   oDntae is a 50 year old, presenting for the following health issues:  RECHECK (Detox)        2/19/2025     8:25 AM   Additional Questions   Roomed by Ashlee ALANIS CMA   Accompanied by SPENSER       Video Start Time:  08:30    History of Present Illness       Reason for visit:  Follow up    She eats 4 or more servings of fruits and vegetables daily.She consumes 0 sweetened beverage(s) daily.She exercises with enough effort to increase her heart rate 30 to 60 minutes per day.  She exercises with enough effort to increase her heart rate 7 days per week.   She is taking medications regularly.     Patient has history of alcohol.  She has a lot of anxiety.  She does not want to leave her home. She self medicates with alcohol.  She has been in treatment inpatient and out patient.  She would like to try Buspar.  Objective           Vitals:  No vitals were obtained today due to virtual visit.    Physical Exam   GENERAL: alert and no distress  EYES: Eyes grossly normal to inspection.  No discharge or erythema, or obvious scleral/conjunctival abnormalities.  RESP: No audible wheeze, cough, or visible cyanosis.    SKIN: Visible skin clear. No significant rash, abnormal pigmentation or lesions.  NEURO: Cranial nerves grossly intact.  Mentation and speech appropriate for age.  PSYCH: Appropriate affect, tone, and pace of words           Video-Visit Details    Type of service:  Video Visit   Video End Time:8:51 AM  Originating Location (pt. Location): Home    Distant Location (provider location):  On-site  Platform used for Video Visit: Meaghan  Signed Electronically by: Junior Crane MD

## 2025-02-19 NOTE — LETTER
February 19, 2025      Dontae Weston  [id number] [place] [id number]        To Whom It May Concern:    Dontae Weston was seen in our clinic by a video visit. She may return to work February 25/2025. Excuse from 2/14/2025 through the 2/24/2025.    Sincerely,           Electronically signed

## 2025-02-24 ENCOUNTER — HOSPITAL ENCOUNTER (INPATIENT)
Facility: CLINIC | Age: 50
LOS: 1 days | Discharge: PSYCHIATRIC OR SUBSTANCE USE FACILITY | End: 2025-02-26
Attending: STUDENT IN AN ORGANIZED HEALTH CARE EDUCATION/TRAINING PROGRAM | Admitting: PSYCHIATRY & NEUROLOGY
Payer: COMMERCIAL

## 2025-02-24 DIAGNOSIS — F17.200 NICOTINE USE DISORDER: ICD-10-CM

## 2025-02-24 DIAGNOSIS — F41.9 ANXIETY: ICD-10-CM

## 2025-02-24 DIAGNOSIS — F10.20 SEVERE ALCOHOL USE DISORDER (H): ICD-10-CM

## 2025-02-24 DIAGNOSIS — G47.00 INSOMNIA, UNSPECIFIED TYPE: ICD-10-CM
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
## 2025-02-24 DIAGNOSIS — F10.930 ALCOHOL WITHDRAWAL SYNDROME WITHOUT COMPLICATION (H): ICD-10-CM

## 2025-02-24 DIAGNOSIS — F10.90 ALCOHOL USE DISORDER: Primary | ICD-10-CM

## 2025-02-24 LAB
ALBUMIN SERPL BCG-MCNC: 4.9 G/DL (ref 3.5–5.2)
ALCOHOL BREATH TEST: 0.13 (ref 0–0.01)
ALCOHOL BREATH TEST: 0.3 (ref 0–0.01)
ALP SERPL-CCNC: 56 U/L (ref 40–150)
ALT SERPL W P-5'-P-CCNC: 22 U/L (ref 0–50)
ANION GAP SERPL CALCULATED.3IONS-SCNC: 15 MMOL/L (ref 7–15)
AST SERPL W P-5'-P-CCNC: 33 U/L (ref 0–45)
BASOPHILS # BLD AUTO: 0.1 10E3/UL (ref 0–0.2)
BASOPHILS NFR BLD AUTO: 2 %
BILIRUB SERPL-MCNC: 0.3 MG/DL
BUN SERPL-MCNC: 5.6 MG/DL (ref 6–20)
CALCIUM SERPL-MCNC: 9.3 MG/DL (ref 8.8–10.4)
CHLORIDE SERPL-SCNC: 102 MMOL/L (ref 98–107)
CREAT SERPL-MCNC: 0.45 MG/DL (ref 0.51–0.95)
EGFRCR SERPLBLD CKD-EPI 2021: >90 ML/MIN/1.73M2
EOSINOPHIL # BLD AUTO: 0 10E3/UL (ref 0–0.7)
EOSINOPHIL NFR BLD AUTO: 1 %
ERYTHROCYTE [DISTWIDTH] IN BLOOD BY AUTOMATED COUNT: 13.5 % (ref 10–15)
ETHANOL SERPL-MCNC: 0.37 G/DL
GLUCOSE SERPL-MCNC: 85 MG/DL (ref 70–99)
HCO3 SERPL-SCNC: 27 MMOL/L (ref 22–29)
HCT VFR BLD AUTO: 36.6 % (ref 35–47)
HGB BLD-MCNC: 12.9 G/DL (ref 11.7–15.7)
IMM GRANULOCYTES # BLD: 0.1 10E3/UL
IMM GRANULOCYTES NFR BLD: 2 %
LYMPHOCYTES # BLD AUTO: 1.6 10E3/UL (ref 0.8–5.3)
LYMPHOCYTES NFR BLD AUTO: 39 %
MAGNESIUM SERPL-MCNC: 2.2 MG/DL (ref 1.7–2.3)
MCH RBC QN AUTO: 30.5 PG (ref 26.5–33)
MCHC RBC AUTO-ENTMCNC: 35.2 G/DL (ref 31.5–36.5)
MCV RBC AUTO: 87 FL (ref 78–100)
MONOCYTES # BLD AUTO: 0.3 10E3/UL (ref 0–1.3)
MONOCYTES NFR BLD AUTO: 8 %
NEUTROPHILS # BLD AUTO: 2 10E3/UL (ref 1.6–8.3)
NEUTROPHILS NFR BLD AUTO: 49 %
NRBC # BLD AUTO: 0 10E3/UL
NRBC BLD AUTO-RTO: 0 /100
PLATELET # BLD AUTO: 269 10E3/UL (ref 150–450)
POTASSIUM SERPL-SCNC: 3.9 MMOL/L (ref 3.4–5.3)
PROT SERPL-MCNC: 8.1 G/DL (ref 6.4–8.3)
RBC # BLD AUTO: 4.23 10E6/UL (ref 3.8–5.2)
SODIUM SERPL-SCNC: 144 MMOL/L (ref 135–145)
WBC # BLD AUTO: 4 10E3/UL (ref 4–11)

## 2025-02-24 PROCEDURE — 82040 ASSAY OF SERUM ALBUMIN: CPT | Performed by: STUDENT IN AN ORGANIZED HEALTH CARE EDUCATION/TRAINING PROGRAM

## 2025-02-24 PROCEDURE — 82077 ASSAY SPEC XCP UR&BREATH IA: CPT | Performed by: STUDENT IN AN ORGANIZED HEALTH CARE EDUCATION/TRAINING PROGRAM

## 2025-02-24 PROCEDURE — 36415 COLL VENOUS BLD VENIPUNCTURE: CPT | Performed by: STUDENT IN AN ORGANIZED HEALTH CARE EDUCATION/TRAINING PROGRAM

## 2025-02-24 PROCEDURE — 99285 EMERGENCY DEPT VISIT HI MDM: CPT | Performed by: EMERGENCY MEDICINE

## 2025-02-24 PROCEDURE — 83735 ASSAY OF MAGNESIUM: CPT | Performed by: STUDENT IN AN ORGANIZED HEALTH CARE EDUCATION/TRAINING PROGRAM

## 2025-02-24 PROCEDURE — 82075 ASSAY OF BREATH ETHANOL: CPT | Performed by: EMERGENCY MEDICINE

## 2025-02-24 PROCEDURE — 84443 ASSAY THYROID STIM HORMONE: CPT | Performed by: PSYCHIATRY & NEUROLOGY

## 2025-02-24 PROCEDURE — 82075 ASSAY OF BREATH ETHANOL: CPT | Mod: 91 | Performed by: EMERGENCY MEDICINE

## 2025-02-24 PROCEDURE — 82977 ASSAY OF GGT: CPT | Performed by: PSYCHIATRY & NEUROLOGY

## 2025-02-24 PROCEDURE — 250N000013 HC RX MED GY IP 250 OP 250 PS 637: Performed by: STUDENT IN AN ORGANIZED HEALTH CARE EDUCATION/TRAINING PROGRAM

## 2025-02-24 PROCEDURE — 85025 COMPLETE CBC W/AUTO DIFF WBC: CPT | Performed by: STUDENT IN AN ORGANIZED HEALTH CARE EDUCATION/TRAINING PROGRAM

## 2025-02-24 RX ORDER — NICOTINE 21 MG/24HR
1 PATCH, TRANSDERMAL 24 HOURS TRANSDERMAL ONCE
Status: COMPLETED | OUTPATIENT
Start: 2025-02-24 | End: 2025-02-25

## 2025-02-24 RX ORDER — BACLOFEN 10 MG/1
10 TABLET ORAL 2 TIMES DAILY
COMMUNITY
Start: 2024-09-20

## 2025-02-24 RX ORDER — TRAZODONE HYDROCHLORIDE 100 MG/1
100 TABLET ORAL AT BEDTIME
Status: DISCONTINUED | OUTPATIENT
Start: 2025-02-24 | End: 2025-02-25

## 2025-02-24 RX ORDER — TRAZODONE HYDROCHLORIDE 100 MG/1
100 TABLET ORAL AT BEDTIME
Status: ON HOLD | COMMUNITY
Start: 2024-09-20 | End: 2025-02-26

## 2025-02-24 RX ORDER — GABAPENTIN 100 MG/1
100 CAPSULE ORAL 3 TIMES DAILY
Status: DISCONTINUED | OUTPATIENT
Start: 2025-02-24 | End: 2025-02-25

## 2025-02-24 RX ORDER — BACLOFEN 10 MG/1
10 TABLET ORAL 2 TIMES DAILY
Status: DISCONTINUED | OUTPATIENT
Start: 2025-02-25 | End: 2025-02-26 | Stop reason: HOSPADM

## 2025-02-24 RX ORDER — BUSPIRONE HYDROCHLORIDE 5 MG/1
5 TABLET ORAL 3 TIMES DAILY
Status: DISCONTINUED | OUTPATIENT
Start: 2025-02-24 | End: 2025-02-26 | Stop reason: HOSPADM

## 2025-02-24 RX ORDER — GABAPENTIN 100 MG/1
100 CAPSULE ORAL 3 TIMES DAILY
Status: ON HOLD | COMMUNITY
Start: 2024-09-20 | End: 2025-02-26

## 2025-02-24 RX ORDER — HYDROXYZINE HYDROCHLORIDE 25 MG/1
50 TABLET, FILM COATED ORAL 3 TIMES DAILY PRN
Status: DISCONTINUED | OUTPATIENT
Start: 2025-02-24 | End: 2025-02-25

## 2025-02-24 RX ADMIN — TRAZODONE HYDROCHLORIDE 100 MG: 50 TABLET ORAL at 21:18

## 2025-02-24 RX ADMIN — GABAPENTIN 100 MG: 100 CAPSULE ORAL at 21:18

## 2025-02-24 RX ADMIN — HYDROXYZINE HYDROCHLORIDE 50 MG: 25 TABLET, FILM COATED ORAL at 21:17

## 2025-02-24 RX ADMIN — BUSPIRONE HYDROCHLORIDE 5 MG: 5 TABLET ORAL at 21:33

## 2025-02-24 RX ADMIN — NICOTINE 1 PATCH: 21 PATCH, EXTENDED RELEASE TRANSDERMAL at 16:23

## 2025-02-24 ASSESSMENT — ACTIVITIES OF DAILY LIVING (ADL)
ADLS_ACUITY_SCORE: 41

## 2025-02-24 ASSESSMENT — COLUMBIA-SUICIDE SEVERITY RATING SCALE - C-SSRS: IS THE PATIENT NOT ABLE TO COMPLETE C-SSRS: REFUSES TO ANSWER

## 2025-02-24 NOTE — ED PROVIDER NOTES
[facility] EMERGENCY DEPARTMENT ([facility])    25      ED PROVIDER NOTE   History     Chief Complaint   Patient presents with    Alcohol Intoxication     The history is provided by the patient and medical records.     Dontae Weston is a 50 year old female who presents to the emergency department seeking alcohol detox.  No history of alcohol withdrawal seizures or DTs. Per family, patient made suicidal statements to them and they would like her to get psych help. History is limited due to the patient's degree of intoxication. She admits drinking at least 15 IPAs a day before switching to hard liquor. She denies seizures/DTs but endorses withdrawal history. Smoking history as well. Denies acute medical complaints.    Past Medical History  Past Medical History:   Diagnosis Date    Abdominal pain, unspecified site     abnormal pap 1997    CRYO. Pt does not believe she had dysplasia    Chemical dependency (H)     alcohol; treated and sober since     Panic attack      Past Surgical History:   Procedure Laterality Date    CRYOCAUTERY OF CERVIX       baclofen (LIORESAL) 10 MG tablet  busPIRone (BUSPAR) 5 MG tablet  gabapentin (NEURONTIN) 100 MG capsule  hydrOXYzine marta (VISTARIL) 25 MG capsule  traZODone (DESYREL) 100 MG tablet      No Known Allergies  Family History  Family History   Problem Relation Age of Onset    Unknown/Adopted Maternal Grandmother     Alzheimer Disease Maternal Grandfather     Heart Disease Paternal Grandmother     Cancer Paternal Grandfather         brain tumor    Melanoma No family hx of     Skin Cancer No family hx of      Social History   Social History     Tobacco Use    Smoking status: Former     Current packs/day: 0.00     Types: Cigarettes     Quit date: 2023     Years since quittin.1    Smokeless tobacco: Never   Vaping Use    Vaping status: Never Used   Substance Use Topics    Alcohol use: No    Drug use: No      Past medical history, past surgical  history, medications, allergies, family history, and social history were reviewed with the patient. No additional pertinent items.   A medically appropriate review of systems was performed with pertinent positives and negatives noted in the HPI, and all other systems negative.    Physical Exam   BP: 118/82  Pulse: 93  Temp: 98.1  F (36.7  C)  Resp: 16  SpO2: 97 %  Physical Exam  Vital Signs Reviewed  Gen: Well nourished, well developed, resting comfortably, no acute distress  HEENT: NC/AT, PERRL, EOMI, MMM. Scleral injection.  Neck: Supple, FROM  CV: Regular Rate  Lungs/Chest: Normal Effort  Abd: Non-distended  MSK/Back: FROM, no visible deformity  Neuro: A&Ox3, GCS 15, CN II-XII unremarkable. Strength and sensation globally intact. Slurred speech and unsteady gait.  Skin: Warm, Dry, Intact, no visible lesions     ED Course, Procedures, & Data      Procedures                Results for orders placed or performed during the hospital encounter of 02/24/25   Comprehensive metabolic panel     Status: Abnormal   Result Value Ref Range    Sodium 144 135 - 145 mmol/L    Potassium 3.9 3.4 - 5.3 mmol/L    Carbon Dioxide (CO2) 27 22 - 29 mmol/L    Anion Gap 15 7 - 15 mmol/L    Urea Nitrogen 5.6 (L) 6.0 - 20.0 mg/dL    Creatinine 0.45 (L) 0.51 - 0.95 mg/dL    GFR Estimate >90 >60 mL/min/1.73m2    Calcium 9.3 8.8 - 10.4 mg/dL    Chloride 102 98 - 107 mmol/L    Glucose 85 70 - 99 mg/dL    Alkaline Phosphatase 56 40 - 150 U/L    AST 33 0 - 45 U/L    ALT 22 0 - 50 U/L    Protein Total 8.1 6.4 - 8.3 g/dL    Albumin 4.9 3.5 - 5.2 g/dL    Bilirubin Total 0.3 <=1.2 mg/dL   Magnesium     Status: Normal   Result Value Ref Range    Magnesium 2.2 1.7 - 2.3 mg/dL   Ethyl Alcohol Level     Status: Abnormal   Result Value Ref Range    Alcohol ethyl 0.37 (HH) <=0.01 g/dL   CBC with platelets and differential     Status: None   Result Value Ref Range    WBC Count 4.0 4.0 - 11.0 10e3/uL    RBC Count 4.23 3.80 - 5.20 10e6/uL    Hemoglobin 12.9  11.7 - 15.7 g/dL    Hematocrit 36.6 35.0 - 47.0 %    MCV 87 78 - 100 fL    MCH 30.5 26.5 - 33.0 pg    MCHC 35.2 31.5 - 36.5 g/dL    RDW 13.5 10.0 - 15.0 %    Platelet Count 269 150 - 450 10e3/uL    % Neutrophils 49 %    % Lymphocytes 39 %    % Monocytes 8 %    % Eosinophils 1 %    % Basophils 2 %    % Immature Granulocytes 2 %    NRBCs per 100 WBC 0 <1 /100    Absolute Neutrophils 2.0 1.6 - 8.3 10e3/uL    Absolute Lymphocytes 1.6 0.8 - 5.3 10e3/uL    Absolute Monocytes 0.3 0.0 - 1.3 10e3/uL    Absolute Eosinophils 0.0 0.0 - 0.7 10e3/uL    Absolute Basophils 0.1 0.0 - 0.2 10e3/uL    Absolute Immature Granulocytes 0.1 <=0.4 10e3/uL    Absolute NRBCs 0.0 10e3/uL   Alcohol breath test POCT     Status: Abnormal   Result Value Ref Range    Alcohol Breath Test 0.304 (A) 0.00 - 0.01   Alcohol breath test POCT     Status: Abnormal   Result Value Ref Range    Alcohol Breath Test 0.129 (A) 0.00 - 0.01   CBC with platelets differential     Status: None    Narrative    The following orders were created for panel order CBC with platelets differential.  Procedure                               Abnormality         Status                     ---------                               -----------         ------                     CBC with platelets and d...[[id number]]                      Final result                 Please view results for these tests on the individual orders.     Medications   nicotine (NICODERM CQ) 21 MG/24HR 24 hr patch 1 patch (1 patch Transdermal $Patch/Med Applied 2/24/25 1623)   nicotine polacrilex (NICORETTE) gum 4 mg (has no administration in time range)   hydrOXYzine HCl (ATARAX) tablet 50 mg (50 mg Oral $Given 2/24/25 2117)   traZODone (DESYREL) tablet 100 mg (100 mg Oral $Given 2/24/25 2118)   baclofen (LIORESAL) tablet 10 mg (has no administration in time range)   busPIRone (BUSPAR) tablet 5 mg (5 mg Oral $Given 2/24/25 2133)   gabapentin (NEURONTIN) capsule 100 mg (100 mg Oral $Given 2/24/25 2118)      Labs Ordered and Resulted from Time of ED Arrival to Time of ED Departure   COMPREHENSIVE METABOLIC PANEL - Abnormal       Result Value    Sodium 144      Potassium 3.9      Carbon Dioxide (CO2) 27      Anion Gap 15      Urea Nitrogen 5.6 (*)     Creatinine 0.45 (*)     GFR Estimate >90      Calcium 9.3      Chloride 102      Glucose 85      Alkaline Phosphatase 56      AST 33      ALT 22      Protein Total 8.1      Albumin 4.9      Bilirubin Total 0.3     ETHYL ALCOHOL LEVEL - Abnormal    Alcohol ethyl 0.37 (*)    ALCOHOL BREATH TEST POCT - Abnormal    Alcohol Breath Test 0.304 (*)    ALCOHOL BREATH TEST POCT - Abnormal    Alcohol Breath Test 0.129 (*)    MAGNESIUM - Normal    Magnesium 2.2     CBC WITH PLATELETS AND DIFFERENTIAL    WBC Count 4.0      RBC Count 4.23      Hemoglobin 12.9      Hematocrit 36.6      MCV 87      MCH 30.5      MCHC 35.2      RDW 13.5      Platelet Count 269      % Neutrophils 49      % Lymphocytes 39      % Monocytes 8      % Eosinophils 1      % Basophils 2      % Immature Granulocytes 2      NRBCs per 100 WBC 0      Absolute Neutrophils 2.0      Absolute Lymphocytes 1.6      Absolute Monocytes 0.3      Absolute Eosinophils 0.0      Absolute Basophils 0.1      Absolute Immature Granulocytes 0.1      Absolute NRBCs 0.0     ROUTINE UA WITH MICROSCOPIC REFLEX TO CULTURE     No orders to display          Critical care was not performed.     Medical Decision Making  The patient's presentation was of high complexity (a chronic illness severe exacerbation, progression, or side effect of treatment).    The patient's evaluation involved:  ordering and/or review of 3+ test(s) in this encounter (see separate area of note for details)  discussion of management or test interpretation with another health professional (Detox baylee, DEC)    The patient's management necessitated moderate risk (prescription drug management including medications given in the ED), high risk (a decision regarding  "hospitalization), and further care after sign-out to Dr. Reed (see their note for further management).    Assessment & Plan    Dontae Weston is a 50 year old female who presents to the emergency department seeking alcohol detox.    This is a 50 year old female with an unfortunate history of EtOH use disorder. Recently returned to drinking for the last several weeks. Showed up to work with concern for intoxication. PCP recommended FMLA. At [facility] recently for EtOH. Here with father and son. Patient requested to come here with detox.     On arrival she was upset she was not going straight to detox. She was emotionally labile, becoming insulting and quite cruel but not aggressive or combative. Family reports when sober she is not like this, and believes behavior is related to her intoxication.    Family does not wish to take her home at her current level of intoxication. Patient is too intoxicated to leave. She will be placed on Health Officer Hold initially.    She made statements concerning to her father last night regarding suicide. Will get DEC assessment when she dannielle up as well.    A detox bed has been held. Will complete intake after patient clears further.    Patient received evening meds and is now sleeping.  Still intoxicated. Will undergo DEC assessment and be reassessed to see if she wishes to voluntarily go to detox.  If she no longer wishes to go to detox detox bed hold will be lifted and she will be discharged.  If she wishes to go to detox intake will be provided to the detox team.    Patient spoke with DEC and denies any SI concerns at this time. Does not recall statements made to family last night. DEC and I discussed, OK to go to detox.    Patient Dors is beginning to experience withdrawal symptoms.  Will start on Valium based MSSA protocol.  I did try to call [facility] as patient stated she wanted to go there but got no answer.  The patient states \"they never picked up.  \"    She would like to " go through our detox program here while trying to get into [facility] for further treatment.  Intake information provided.  Signed out to Dr. Reed.    New Prescriptions    No medications on file       Final diagnoses:   Severe alcohol use disorder (H)   Suicidal ideation       Hai Boles Jr., MD   [facility] EMERGENCY DEPARTMENT  2/24/2025        Hai Boles MD  02/24/25 [id number]       Hai Boles MD  02/25/25 1859

## 2025-02-24 NOTE — ED NOTES
Writer attempted to meet with pt for DEC assessment. Per nurse, pt is intoxicated. Breath alcohol test was .304 at 1655. DEC assessment will be deferred to a later time when pt is less intoxicated and able to engage appropriately in assessment.     Nurse will contact DEC when pt appears ready for assessment.     Kerline Wray [facility]   [phone]

## 2025-02-24 NOTE — ED TRIAGE NOTES
Patient has been drinking all day every day for over a week. Has been drinking on and off for  over 20 years. Has made suicidal comments to family. Patient verbally abusive to staff, making racist comments to staff and other patients. Family does not believe she has had withdrawal seizures or Dts.

## 2025-02-25 ENCOUNTER — TELEPHONE (OUTPATIENT)
Dept: BEHAVIORAL HEALTH | Facility: CLINIC | Age: 50
End: 2025-02-25
Payer: COMMERCIAL

## 2025-02-25 PROBLEM — F10.20 SEVERE ALCOHOL USE DISORDER (H): Status: ACTIVE | Noted: 2025-02-25

## 2025-02-25 PROBLEM — F10.930 ALCOHOL WITHDRAWAL SYNDROME WITHOUT COMPLICATION (H): Status: ACTIVE | Noted: 2025-02-25

## 2025-02-25 PROBLEM — F41.9 ANXIETY: Status: ACTIVE | Noted: 2025-02-25

## 2025-02-25 LAB
ALBUMIN UR-MCNC: NEGATIVE MG/DL
AMPHETAMINES UR QL SCN: ABNORMAL
APPEARANCE UR: CLEAR
BARBITURATES UR QL SCN: ABNORMAL
BENZODIAZ UR QL SCN: ABNORMAL
BILIRUB UR QL STRIP: NEGATIVE
BZE UR QL SCN: ABNORMAL
CANNABINOIDS UR QL SCN: ABNORMAL
COLOR UR AUTO: ABNORMAL
FENTANYL UR QL: ABNORMAL
GGT SERPL-CCNC: 29 U/L (ref 5–36)
GLUCOSE UR STRIP-MCNC: NEGATIVE MG/DL
HGB UR QL STRIP: NEGATIVE
KETONES UR STRIP-MCNC: NEGATIVE MG/DL
LEUKOCYTE ESTERASE UR QL STRIP: ABNORMAL
MUCOUS THREADS #/AREA URNS LPF: PRESENT /LPF
NITRATE UR QL: NEGATIVE
OPIATES UR QL SCN: ABNORMAL
PCP QUAL URINE (ROCHE): ABNORMAL
PH UR STRIP: 5 [PH] (ref 5–7)
RBC URINE: 1 /HPF
SP GR UR STRIP: 1.01 (ref 1–1.03)
SQUAMOUS EPITHELIAL: 2 /HPF
TSH SERPL DL<=0.005 MIU/L-ACNC: 0.69 UIU/ML (ref 0.3–4.2)
UROBILINOGEN UR STRIP-MCNC: NORMAL MG/DL
WBC URINE: 9 /HPF

## 2025-02-25 PROCEDURE — 81001 URINALYSIS AUTO W/SCOPE: CPT | Performed by: STUDENT IN AN ORGANIZED HEALTH CARE EDUCATION/TRAINING PROGRAM

## 2025-02-25 PROCEDURE — 81003 URINALYSIS AUTO W/O SCOPE: CPT | Performed by: STUDENT IN AN ORGANIZED HEALTH CARE EDUCATION/TRAINING PROGRAM

## 2025-02-25 PROCEDURE — 99222 1ST HOSP IP/OBS MODERATE 55: CPT | Performed by: NURSE PRACTITIONER

## 2025-02-25 PROCEDURE — 80307 DRUG TEST PRSMV CHEM ANLYZR: CPT | Performed by: STUDENT IN AN ORGANIZED HEALTH CARE EDUCATION/TRAINING PROGRAM

## 2025-02-25 PROCEDURE — 250N000013 HC RX MED GY IP 250 OP 250 PS 637: Performed by: NURSE PRACTITIONER

## 2025-02-25 PROCEDURE — 250N000013 HC RX MED GY IP 250 OP 250 PS 637: Performed by: PSYCHIATRY & NEUROLOGY

## 2025-02-25 PROCEDURE — 250N000013 HC RX MED GY IP 250 OP 250 PS 637: Performed by: STUDENT IN AN ORGANIZED HEALTH CARE EDUCATION/TRAINING PROGRAM

## 2025-02-25 PROCEDURE — 128N000004 HC R&B CD ADULT

## 2025-02-25 PROCEDURE — 99221 1ST HOSP IP/OBS SF/LOW 40: CPT

## 2025-02-25 PROCEDURE — HZ2ZZZZ DETOXIFICATION SERVICES FOR SUBSTANCE ABUSE TREATMENT: ICD-10-PCS | Performed by: PSYCHIATRY & NEUROLOGY

## 2025-02-25 RX ORDER — HYDROXYZINE HYDROCHLORIDE 25 MG/1
25 TABLET, FILM COATED ORAL EVERY 4 HOURS PRN
Status: DISCONTINUED | OUTPATIENT
Start: 2025-02-25 | End: 2025-02-26 | Stop reason: HOSPADM

## 2025-02-25 RX ORDER — DIAZEPAM 5 MG/1
5-20 TABLET ORAL EVERY 30 MIN PRN
Status: DISCONTINUED | OUTPATIENT
Start: 2025-02-25 | End: 2025-02-26 | Stop reason: HOSPADM

## 2025-02-25 RX ORDER — LOPERAMIDE HYDROCHLORIDE 2 MG/1
2 CAPSULE ORAL 4 TIMES DAILY PRN
Status: DISCONTINUED | OUTPATIENT
Start: 2025-02-25 | End: 2025-02-26 | Stop reason: HOSPADM

## 2025-02-25 RX ORDER — NICOTINE 21 MG/24HR
1 PATCH, TRANSDERMAL 24 HOURS TRANSDERMAL ONCE
Status: COMPLETED | OUTPATIENT
Start: 2025-02-25 | End: 2025-02-26

## 2025-02-25 RX ORDER — DIAZEPAM 5 MG/1
5-20 TABLET ORAL EVERY 30 MIN PRN
Status: DISCONTINUED | OUTPATIENT
Start: 2025-02-25 | End: 2025-02-25

## 2025-02-25 RX ORDER — ATENOLOL 50 MG/1
50 TABLET ORAL DAILY PRN
Status: DISCONTINUED | OUTPATIENT
Start: 2025-02-25 | End: 2025-02-26 | Stop reason: HOSPADM

## 2025-02-25 RX ORDER — TRAZODONE HYDROCHLORIDE 50 MG/1
50 TABLET ORAL
Status: DISCONTINUED | OUTPATIENT
Start: 2025-02-25 | End: 2025-02-25

## 2025-02-25 RX ORDER — DIAZEPAM 10 MG/1
10 TABLET ORAL ONCE
Status: COMPLETED | OUTPATIENT
Start: 2025-02-25 | End: 2025-02-25

## 2025-02-25 RX ORDER — MULTIPLE VITAMINS W/ MINERALS TAB 9MG-400MCG
1 TAB ORAL DAILY
Status: DISCONTINUED | OUTPATIENT
Start: 2025-02-25 | End: 2025-02-26 | Stop reason: HOSPADM

## 2025-02-25 RX ORDER — ONDANSETRON 4 MG/1
4 TABLET, ORALLY DISINTEGRATING ORAL EVERY 6 HOURS PRN
Status: DISCONTINUED | OUTPATIENT
Start: 2025-02-25 | End: 2025-02-26 | Stop reason: HOSPADM

## 2025-02-25 RX ORDER — FOLIC ACID 1 MG/1
1 TABLET ORAL DAILY
Status: DISCONTINUED | OUTPATIENT
Start: 2025-02-25 | End: 2025-02-26 | Stop reason: HOSPADM

## 2025-02-25 RX ORDER — AMOXICILLIN 250 MG
1 CAPSULE ORAL 2 TIMES DAILY PRN
Status: DISCONTINUED | OUTPATIENT
Start: 2025-02-25 | End: 2025-02-26 | Stop reason: HOSPADM

## 2025-02-25 RX ORDER — ACETAMINOPHEN 325 MG/1
650 TABLET ORAL EVERY 4 HOURS PRN
Status: DISCONTINUED | OUTPATIENT
Start: 2025-02-25 | End: 2025-02-26 | Stop reason: HOSPADM

## 2025-02-25 RX ORDER — TRAZODONE HYDROCHLORIDE 100 MG/1
100 TABLET ORAL
Status: DISCONTINUED | OUTPATIENT
Start: 2025-02-25 | End: 2025-02-26 | Stop reason: HOSPADM

## 2025-02-25 RX ORDER — MAGNESIUM HYDROXIDE/ALUMINUM HYDROXICE/SIMETHICONE 120; 1200; 1200 MG/30ML; MG/30ML; MG/30ML
30 SUSPENSION ORAL EVERY 4 HOURS PRN
Status: DISCONTINUED | OUTPATIENT
Start: 2025-02-25 | End: 2025-02-26 | Stop reason: HOSPADM

## 2025-02-25 RX ADMIN — LOPERAMIDE HYDROCHLORIDE 2 MG: 2 CAPSULE ORAL at 20:26

## 2025-02-25 RX ADMIN — BACLOFEN 10 MG: 10 TABLET ORAL at 20:11

## 2025-02-25 RX ADMIN — BUSPIRONE HYDROCHLORIDE 5 MG: 5 TABLET ORAL at 20:11

## 2025-02-25 RX ADMIN — BACLOFEN 10 MG: 10 TABLET ORAL at 08:21

## 2025-02-25 RX ADMIN — TRAZODONE HYDROCHLORIDE 100 MG: 100 TABLET ORAL at 20:11

## 2025-02-25 RX ADMIN — BUSPIRONE HYDROCHLORIDE 5 MG: 5 TABLET ORAL at 15:04

## 2025-02-25 RX ADMIN — DIAZEPAM 10 MG: 5 TABLET ORAL at 12:21

## 2025-02-25 RX ADMIN — GABAPENTIN 100 MG: 100 CAPSULE ORAL at 08:20

## 2025-02-25 RX ADMIN — DIAZEPAM 10 MG: 10 TABLET ORAL at 02:02

## 2025-02-25 RX ADMIN — HYDROXYZINE HYDROCHLORIDE 25 MG: 25 TABLET, FILM COATED ORAL at 08:20

## 2025-02-25 RX ADMIN — ATENOLOL 50 MG: 50 TABLET ORAL at 20:11

## 2025-02-25 RX ADMIN — DIAZEPAM 10 MG: 5 TABLET ORAL at 08:20

## 2025-02-25 RX ADMIN — BUSPIRONE HYDROCHLORIDE 5 MG: 5 TABLET ORAL at 08:21

## 2025-02-25 RX ADMIN — DIAZEPAM 10 MG: 5 TABLET ORAL at 16:28

## 2025-02-25 RX ADMIN — NICOTINE 1 PATCH: 21 PATCH, EXTENDED RELEASE TRANSDERMAL at 12:22

## 2025-02-25 ASSESSMENT — ACTIVITIES OF DAILY LIVING (ADL)
ORAL_HYGIENE: INDEPENDENT
ADLS_ACUITY_SCORE: 15
ORAL_HYGIENE: INDEPENDENT
ADLS_ACUITY_SCORE: 15
HYGIENE/GROOMING: INDEPENDENT
DRESS: INDEPENDENT
ADLS_ACUITY_SCORE: 41
ADLS_ACUITY_SCORE: 15
ADLS_ACUITY_SCORE: 15
LAUNDRY: WITH SUPERVISION
ADLS_ACUITY_SCORE: 41
ADLS_ACUITY_SCORE: 15
ADLS_ACUITY_SCORE: 41
ADLS_ACUITY_SCORE: 41
ADLS_ACUITY_SCORE: 15
ADLS_ACUITY_SCORE: 15
HYGIENE/GROOMING: INDEPENDENT
ADLS_ACUITY_SCORE: 15
DRESS: SCRUBS (BEHAVIORAL HEALTH)
ADLS_ACUITY_SCORE: 15

## 2025-02-25 NOTE — PLAN OF CARE
Problem: Sleep Disturbance  Goal: Adequate Sleep/Rest  Outcome: Progressing  Intervention: Promote Sleep/Rest  Sleep/Rest Enhancement:   awakenings minimized   noise level reduced   regular sleep/rest pattern promoted   Goal Outcome Evaluation:    The patient's MSSA scores were 7. Pt slept for 2.75 hrs,  safety checks were completed, intermittent body movements noted and no respiratory distress noted.

## 2025-02-25 NOTE — CONSULTS
"[facility]  Consult Note - Hospitalist Service  Date of Admission:  2/24/2025  Consult Requested by: Psychiatry; Dr Rand Chairez  Reason for Consult: Detox    Assessment & Plan   Dontae Weston is a 50 year old female with PMH notable for AUD, anxiety admitted on 2/24/2025 for alcohol detox. Medicine was consulted for co-management.     #Acute alcohol withdrawal  #Alcohol use disorder  MAINOR on admission 0.37.  Reports drinking at least 15 IPAs per day followed by hard liquor. Denies h/o withdrawal seizures or DTs.  Utox screen positive for benzos.  Labs on admission otherwise unremarkable.  - Management per Psychiatry  - Agree w/ MSSA w/ diazepam   - Agree w/ folate, MVI, thiamine  - PRNs for withdrawal symptom management per Psychiatry   - PTA baclofen 10mg BID (presumed for AUD tx) continued per Psychiatry    #Abnormal UA  UA on admission w/ small LE, 9 WBC, 2 squamous epithelial cells, mucus. Did not reflex to culture. Pt without urinary symptoms.   - No need for treatment    #Anxiety  On PTA buspirone 5mg TID, gabapentin 100mg TID, hydroxyzine 50mg TID PRN for anxiety, trazodone 100mg at bedtime.   - Management per Psychiatry    #Nicotine dependence: Nicotine patch     Medicine will sign off on this patient. Thank you for allowing us to participate in the care of this patient. Please do not hesitate to reach out with further questions and concerns.       The patient's care was discussed with the Patient.    Clinically Significant Risk Factors Present on Admission                             # Cachexia: Estimated body mass index is 16.91 kg/m  as calculated from the following:    Height as of this encounter: 1.613 m (5' 3.5\").    Weight as of this encounter: 44 kg (97 lb).       # Financial/Environmental Concerns: none         Jonnie Dietz PA-C  Hospitalist Service  Securely message with [employer] (more info)  Text page via [employer] Paging/Directory "   ______________________________________________________________________    Chief Complaint   Alcohol detox    History is obtained from the patient and electronic health record    History of Present Illness   Dontae Weston is a 50 year old female with PMH notable for AUD, anxiety admitted on 2/24/2025 for alcohol detox. Medicine was consulted for co-management.     Patient is resting in bed.  She is rather sleepy, not overly interactive, so limited history obtained.  Endorses drinking at least 15 beers each day.  No chest pain or shortness of breath, no belly pain.      Past Medical History    Past Medical History:   Diagnosis Date    Abdominal pain, unspecified site     abnormal pap 01/01/1997    CRYO. Pt does not believe she had dysplasia    Chemical dependency (H)     alcohol; treated and sober since 2002    Panic attack        Past Surgical History   Past Surgical History:   Procedure Laterality Date    CRYOCAUTERY OF CERVIX  1997       Medications   I have reviewed this patient's current medications  Current Facility-Administered Medications   Medication Dose Route Frequency Provider Last Rate Last Admin    acetaminophen (TYLENOL) tablet 650 mg  650 mg Oral Q4H PRN Rand Chairez MD        alum & mag hydroxide-simethicone (MAALOX) suspension 30 mL  30 mL Oral Q4H PRN Rand Chairez MD        atenolol (TENORMIN) tablet 50 mg  50 mg Oral Daily PRN Rand Chairez MD        baclofen (LIORESAL) tablet 10 mg  10 mg Oral BID Hai Boles MD   10 mg at 02/25/25 0821    busPIRone (BUSPAR) tablet 5 mg  5 mg Oral TID Hai Boles MD   5 mg at 02/25/25 0821    diazepam (VALIUM) tablet 5-20 mg  5-20 mg Oral Q30 Min PRN Rand Chairez MD   10 mg at 02/25/25 1221    folic acid (FOLVITE) tablet 1 mg  1 mg Oral Daily Rand Chairez MD        hydrOXYzine HCl (ATARAX) tablet 25 mg  25 mg Oral Q4H PRN Rand Chairez MD   25 mg at 02/25/25 0820    loperamide (IMODIUM) capsule 2 mg  2 mg Oral 4x Daily PRN Rand Chairez MD         multivitamin w/minerals (THERA-VIT-M) tablet 1 tablet  1 tablet Oral Daily Rand Chairez MD        nicotine (NICODERM CQ) 21 MG/24HR 24 hr patch 1 patch  1 patch Transdermal Once Giovanna Castellon APRN CNP   1 patch at 02/25/25 1222    nicotine (NICODERM CQ) 21 MG/24HR 24 hr patch 1 patch  1 patch Transdermal Once Hai Boles MD   1 patch at 02/24/25 1623    nicotine polacrilex (NICORETTE) gum 4 mg  4 mg Buccal Q1H PRN Hai Boles MD        ondansetron (ZOFRAN ODT) ODT tab 4 mg  4 mg Oral Q6H PRN Rand Chairez MD        senna-docusate (SENOKOT-S/PERICOLACE) 8.6-50 MG per tablet 1 tablet  1 tablet Oral BID PRN Rand Chairez MD        thiamine (B-1) tablet 100 mg  100 mg Oral Daily Rand Chairez MD        traZODone (DESYREL) tablet 100 mg  100 mg Oral At Bedtime PRN Giovanna Castellon APRN CNP              Review of Systems    The 5 point Review of Systems is negative other than noted in the HPI or here.     Allergies   No Known Allergies  ------------------------------------------------------------------------     Physical Exam   Vital Signs: Temp: 98.3  F (36.8  C) Temp src: Oral BP: 125/81 Pulse: (!) 127   Resp: 16 SpO2: 94 % O2 Device: None (Room air)    Weight: 97 lbs 0 oz    General Appearance: Lying in bed, very drowsy, not very interactive.  Nontoxic.  Respiratory: Lungs CTAB.  Breathing comfortably on room air.  Cardiovascular: Regular rate and rhythm, no murmur appreciated.  GI: Abdomen is soft, no tenderness to palpation, no guarding or rigidity.  Skin: Warm and dry.  Other: Patient is rather sleepy, minimally interactive.    Medical Decision Making       45 MINUTES SPENT BY ME on the date of service doing chart review, history, exam, documentation & further activities per the note.      Data   ------------------------- PAST 24 HR DATA REVIEWED -----------------------------------------------    I have personally reviewed the following data over the past 24 hrs:    4.0  \   12.9   / 269      144 102 5.6 (L) /  85   3.9 27 0.45 (L) \     ALT: 22 AST: 33 AP: 56 TBILI: 0.3   ALB: 4.9 TOT PROTEIN: 8.1 LIPASE: N/A     TSH: 0.69 T4: N/A A1C: N/A       Imaging results reviewed over the past 24 hrs:   No results found for this or any previous visit (from the past 24 hours).

## 2025-02-25 NOTE — ED NOTES
"Patient was reminded that in order for her to go to Detox she needs to provided urine for Drug Screen. Patient replied \"I don't need to go right now\". Will remind again in an hour.   "

## 2025-02-25 NOTE — TELEPHONE ENCOUNTER
S: [facility] , Provider Elvi calling at 12:55 AM with clinical on a 50 year old/Female presenting for alcohol detox.     B: Pt presents for ETOH detox.   Currently reports drinking 15 beers, daily, then switched to an unknown quaintly of vodka for a month.    Patient reports last use was PTA.  Pt MAINOR: 129  Pt  denies hx of DT  Pt  denies hx of seizures. Last seizure: N/A  Pt endorsing the following symptoms of withdrawal: Shaky and Tremulous  MSSA Score: None- Valium    Pt denies acute mental health or medical concerns. -Did endorse SI towards family but is not endorsing this anymore now sober.   Pt denies other drug use: None Amount/frequency: N/A    Does Pt have a detox care plan in [facility]? No  Does pt present with specific needs, assistive devices, or exclusionary criteria? None  Is the patient ambulating, eating and drinking in the ED? Yes    A: Pt meets criteria to be presented for IP detox admission. Patient is voluntary    COVID Symptoms: No  If yes, COVID test required   Utox: Ordered, not yet collected  Magnesium: WNL  CMP: WNL  CBC: WNL  HCG: Not ordered, intake to request lab      R: Patient cleared and ready for behavioral bed placement: Yes    Pt is meeting criteria for presentation to 3A/CD/[facility]    Does Patient need a Transfer Center request created? No, Pt is located within [facility] ED, [facility] ED, or [facility]     1:26 AM Intake called [facility] and spoke with Debbie meza.    1:51 AM Intake received call from Dr. Mihaela Jasso, accepting this pt to [facility].     1:57 AM Indicia complete.     2:05 AM Intake called st  and provided disposition ot Carla EMMANUEL. Nurse report: Charge will call ED.     2:08 AM Intake called [facility] and provided placement information to [facility].

## 2025-02-25 NOTE — CONSULTS
"Diagnostic Evaluation Consultation  Crisis Assessment    Patient Name: Dontae Weston  Age:  50 year old  Legal Sex: female  Gender Identity: female  Pronouns:   Race: White  Ethnicity: Not  or   Language: English      Patient was assessed: In person   Crisis Assessment Start Date: 02/24/25  Crisis Assessment Start Time: 2330  Crisis Assessment Stop Time: 0000  Patient location: [facility]                             F320-01    Referral Data and Chief Complaint  Dontae Weston presents to the ED with family/friends. Patient is presenting to the ED for the following concerns: Verbal agitation, Intoxication, Anxiety. Factors that make the mental health crisis life threatening or complex are: 50 year old female patient was brought in by her father and her son due to intoxication and patient's father said she made suicidal statements.  Patient's MAINOR was .37, upon arrival.  Reportedly, pt was belligerent and she made racist statements.  She initially denied SI and she declined DEC.  She wanted to leave.  The doctor preferred that she have a DEC Assessment.  Once sober, patient agreed to DEC but she did not remember making suicidal statements and she denied current SI.  She did not remember [facility].  She denied NSSI and HI, as well.  She admitted to having a panic attack.  She was not physically aggressive.  She was more calm and cooperative.  She was sleepy, but oriented x 5.  She denied increased depression.  She admitted to poor sleeping.  She denied having nightmares, flashbacks, and periods of dissocation.  She stated her eating was \"normal.\"  Patient asked for Valium as she was starting to experience withdrawal symptoms:  \"heart pounding, sweaty, shaking.\"  She stated she had DTs before, but she did not have seizures.  She denied having hallucinations, increased paranoia, and delusional thoughts.  She denied " History & Physical


Date & Time of Service:


Dec 22, 2017 at 19:51


Chief Complaint:


SOB


Primary Care Physician:


Claude Rowley M.D.


History of Present Illness


Source:  patient


61 y/o M with extensive medical issues including severe CAD, PVD, COPD, DM II, 

bipolar disease, chronic anemia, diastolic CHF, recent diagnosis of atrial 

thrombus - on Lovenox bridging to Coumadin - pt had an MI at [place] 10/17 

which was medically managed. The was admitted to the hospital on day prior with 

complaints of CP and was discharged the following day after ruling out for an 

MI.  He presents today with SOB and was hypoxic on arrival to the ER.  He 

denies CP, cough, fever, N/V, dysuria.  Initial exam and imaging are consistent 

with volume overload.  Initial labs reveal worsening anemia and an elevated 

troponin.  


The pt was scheduled to see his cardiologist earlier in the day but was unable 

to do so as his  succumbed to an illness.





Past Medical/Surgical History


1) CAD - NSTEMI 2007 - L circ stent  -   ACS 2010 - Catheterization showed 90 % 

mid LAD stenosis, 40 % distal LAD Stenosis,  50% mid LCX stenosis, 100% in-

stent stenosis in the distal L circumflex, 100% obtuse marginal stenosis, 60% 

proximal right coronary stenosis, 40% mid RCA stenosis. Pt had an atherectomy 

and ERICK placed in the LAD at [place].  Suffered additional MI at [place] 10/17.





2) Grade 1 diastolic dysfunction on recent echo - Preserved EF 55%





3) DM 2 





4) Bipolar disease





5) COPD - continues to smoke 





6) Diabetic L foot ulcer requiring wound vac





7) V fib arrest following MI 2007





8) JANE





9) GERD





10) PAD - B/L  femoral stents





11) Chronic anemia - Hb 8-10





12) Atrial thrombus





13) Chronic LE ulcers








Family History





Cancer (esophageal)


Diabetes mellitus


Heart disease


Hypertension





Social History


Smoking Status:  Former Smoker


Drug Use:  none


Marital Status:  


Housing status:  other


Occupational Status:  employed





Immunizations


History of Influenza Vaccine:  No


Influenza Vaccine Date:  Oct 5, 2009


History of Tetanus Vaccine?:  No


Tetanus Immunization Date:  Mar 1, 2004


History of Pneumococcal:  No


Pneumococcal Date:  Oct 15, 2006


History of Hepatitis B Vaccine:  No





Multi-Drug Resistant Organisms


History of MDRO:  No





Allergies


Coded Allergies:  


     No Known Allergies (Verified , 7/19/17)





Home Medications


Scheduled


Amlodipine Besylate (Amlodipine Besylate), 10 MG PO QAM


Clopidogrel Bisulfate (Clopidogrel), 75 MG PO QAM


Cyanocobalamin (Vitamin B12), 1,000 MCG PO DAILY


Divalproex Sodium (Depakote Delay Rel), 2,000 MG PO HS


Enoxaparin Sodium (Lovenox), 0.8 ML SC QD@1700


Escitalopram Oxalate (Escitalopram Oxalate), 20 MG PO QAM


Ferrous Sulfate (Ferrous Sulfate), 325 MG PO BIDM


Gabapentin (Neurontin), 300 MG PO HS


Insulin Glargine (Lantus Solostar), 10 UNITS SC HS


Insulin Lispro (Human) (Humalog Kwikpen), 1 DOSE SC AC


Metoprolol Succinate (Metoprolol Succinate ER), 50 MG PO BID


Mirtazapine (Mirtazapine), 45 MG PO HS


Simvastatin (Simvastatin), 80 MG PO DAILY


Spironolactone (Aldactone), 50 MG PO BID


Tamsulosin HCl (Tamsulosin HCl), 0.4 MG PO HS


Warfarin Sodium (Warfarin Sodium), 1 DOSE PO UD





Scheduled PRN


Acetaminophen (Tylenol), 500 MG PO Q4-6HRS PRN for Pain


Albuterol Sulfate (Proair Respiclick), 1-2 PUFFS INH Q4-6HRS PRN for SOB/

Wheezing


Clonazepam (Clonazepam), 1 MG PO HS PRN for Anxiety


Docusate Sodium (Docusate Sodium), 100 MG PO BID PRN for Constipation


Fluticasone Propionate (Nasal) (Flonase Allergy Relief), 2 SPRAYS VERONICA DAILY PRN 

for Nasal Congestion


Glucagon (Glucagon Emergency Kit), 1 MG UD PRN for Hypoglycemia Protocol


Nitroglycerin (Nitrostat), 0.4 MG UT UD PRN for Chest Pain


Oxycodone HCl (Oxycodone HCl), 5 MG PO Q6H PRN for Breakthrough Pain


Oxycodone Hcl (Oxycodone Hcl), 10 MG PO Q12 PRN for Severe Pain





Review of Systems


Constitutional:  No fever, No chills, No sweats


Eyes:  No worsening of vision


ENT:  No hearing loss, No nasal symptoms


Respiratory:  + shortness of breath, + dyspnea on exertion, + dyspnea at rest, 

No cough, No sputum, No wheezing


Cardiovascular:  No chest pain, No PND


Abdomen:  No pain, No vomiting


Musculoskeletal:  No joint pain


Genitourinary - Male:  No hematuria, No urinary urgency


Neurologic:  + weakness, No memory loss, No paralysis


Psychiatric:  No depression symptoms


Endocrine:  + fatigue


Hematologic / Lymphatic:  No abnormal bleeding/bruising


Integumentary:  No rash


Allergic / Immunologic:  No environmental allergies





Physical Exam


Vital Signs











  Date Time  Temp Pulse Resp B/P (MAP) Pulse Ox O2 Delivery O2 Flow Rate FiO2


 


12/22/17 18:42  68 16 175/75 97 Nasal Cannula  


 


12/22/17 17:50  67      


 


12/22/17 17:44     97 Nasal Cannula 4.0 


 


12/22/17 17:43     95 Nasal Cannula 4.0 


 


12/22/17 17:42     97 Nasal Cannula 4.0 


 


12/22/17 17:36      Nasal Cannula 4.0 


 


12/22/17 17:36 36.6 68 16 190/81 95 Nasal Cannula 4.0 








General Appearance:  WD/WN, no apparent distress


Head:  normocephalic


Eyes:  normal inspection


ENT:  normal ENT inspection


Neck:  supple, + JVD


Respiratory/Chest:  chest non-tender, + crackles (B/L at bases)


Cardiovascular:  regular rate, rhythm, no edema, no gallop


Abdomen/GI:  normal bowel sounds, non tender, soft


Back:  normal inspection, no CVA tenderness


Extremities/Musculoskelatal:  normal inspection, no calf tenderness, normal 

capillary refill


Neurologic/Psych:  CNs II-XII nml as tested, no motor/sensory deficits, alert


Skin:  + pallor





Diagnostics


Laboratory Results





Results Past 24 Hours








Test


  12/22/17


17:55 12/22/17


18:55 Range/Units


 


 


White Blood Count 5.46  4.8-10.8  K/uL


 


Red Blood Count 2.69  4.7-6.1  M/uL


 


Hemoglobin 7.9  14.0-18.0  g/dL


 


Hematocrit 23.9  42-52  %


 


Mean Corpuscular Volume 88.8    fL


 


Mean Corpuscular Hemoglobin 29.4  25-34  pg


 


Mean Corpuscular Hemoglobin


Concent 33.1


  


  32-36  g/dl


 


 


Platelet Count 299  130-400  K/uL


 


Mean Platelet Volume 8.5  7.4-10.4  fL


 


Neutrophils (%) (Auto) 67.2   %


 


Lymphocytes (%) (Auto) 19.4   %


 


Monocytes (%) (Auto) 10.3   %


 


Eosinophils (%) (Auto) 2.2   %


 


Basophils (%) (Auto) 0.2   %


 


Neutrophils # (Auto) 3.67  1.4-6.5  K/uL


 


Lymphocytes # (Auto) 1.06  1.2-3.4  K/uL


 


Monocytes # (Auto) 0.56  0.11-0.59  K/uL


 


Eosinophils # (Auto) 0.12  0-0.5  K/uL


 


Basophils # (Auto) 0.01  0-0.2  K/uL


 


RDW Standard Deviation 53.2  36.4-46.3  fL


 


RDW Coefficient of Variation 16.5  11.5-14.5  %


 


Immature Granulocyte % (Auto) 0.7   %


 


Immature Granulocyte # (Auto) 0.04  0.00-0.02  K/uL


 


Microcytosis PRESENT   


 


Prothrombin Time


  20.2


  


  9.0-12.0


SECONDS


 


Prothromb Time International


Ratio 1.9


  


  0.9-1.1  


 


 


Activated Partial


Thromboplast Time 36.4


  


  21.0-31.0


SECONDS


 


Partial Thromboplastin Ratio 1.4   


 


Sodium Level 139  136-145  mmol/L


 


Potassium Level 5.0  3.5-5.1  mmol/L


 


Chloride Level 109    mmol/L


 


Carbon Dioxide Level 27  21-32  mmol/L


 


Anion Gap 3.0  3-11  mmol/L


 


Blood Urea Nitrogen 41  7-18  mg/dl


 


Creatinine


  1.27


  


  0.60-1.40


mg/dl


 


Est Creatinine Clear Calc


Drug Dose 71.3


  


   ml/min


 


 


Estimated GFR (


American) 70.7


  


   


 


 


Estimated GFR (Non-


American 61.0


  


   


 


 


BUN/Creatinine Ratio 31.9  10-20  


 


Random Glucose 133  70-99  mg/dl


 


Calcium Level 8.4  8.5-10.1  mg/dl


 


Magnesium Level 2.7  1.8-2.4  mg/dl


 


Total Bilirubin 0.2  0.2-1  mg/dl


 


Aspartate Amino Transf


(AST/SGOT) 13


  


  15-37  U/L


 


 


Alanine Aminotransferase


(ALT/SGPT) 11


  


  12-78  U/L


 


 


Alkaline Phosphatase 60    U/L


 


Troponin I 0.990  0-0.045  ng/ml


 


Pro-B-Type Natriuretic Peptide 13829  0-900  pg/ml


 


Total Protein 7.0  6.4-8.2  gm/dl


 


Albumin 2.4  3.4-5.0  gm/dl


 


Globulin 4.6  2.5-4.0  gm/dl


 


Albumin/Globulin Ratio 0.5  0.9-2  


 


Valproic Acid (Depakene) Level 55    mcg/ml








Microbiology Results


12/22/17 Blood Culture, Received


           Pending


12/22/17 Blood Culture, Received


           Pending





Diagnostic Radiology


CXR: consistent with volume overload





EKG


EKG:


NSR - no evidence of acute ischemia





Impression


Assessment and Plan


61 y/o M with extensive medical issues including severe CAD, PVD, COPD, DM II, 

bipolar disease, chronic anemia, diastolic CHF, recent diagnosis of atrial 

thrombus - on Lovenox bridging to Coumadin - pt had an MI at [place] 10/17 

which was medically managed. The was admitted to the hospital on day prior with 

complaints of CP and was discharged the following day after ruling out for an 

MI.  He presents today with SOB and was hypoxic on arrival to the ER.  He 

denies CP, cough, fever, N/V, dysuria.  Initial exam and imaging are consistent 

with volume overload.  Initial labs reveal worsening anemia and an elevated 

troponin.  





1) SOB - acute on chronic, diastolic CHF exacerbation - pt received Lasix in 

the ER - we will continue to diurese with both Lasix and Aldactone until 

hypoxia resolves.  We will continue his B blocker and apply NTG ointment.  





2) Anemia - as his troponin is elevated and he has a history of CAD, we will 

transfuse 2 units PRBCs - he last required transfusions in mid November.  We 

will provide additional Lasix in between transfusions.





3) CAD - elevated troponin w/out related symptoms - may be due to CHF 

exacerbation or demand ischemia as he was hypoxic on arrival.  However, he had 

been admitted several times with volume overload and had not had an elevated 

troponin to this degree.  We will obtain a limited echo therefore and have 

ordered serial enzymes.  He is fully anticoagulated.  We will cont his B 

blocker and change his statin dose to reflect high-intensity therapy.  





4) DM II - placed on SS





5) Atrial thrombus - His INR is therapeutic - we will recheck AM and can likely 

DC daily Lovenox which was employed for bridging.





6) COPD - no evidence of exacerbation - will remain on prescribed inhalers.





7) Bipolar disease - stable - cont Remeron, Clonazepam, Depakote








Full code - anticoagulated with Coumadin


Total time for this admit including review of labs, meds imaging, records, EKG 

- discussion with pt and ER attending - 40 min





Level of Care


Telemetry





Resuscitation Status


FULL RESUSCITATION





VTE Prophylaxis


Given or contraindicated:  Warfarin (Coumadin) "having other stressors.  She said, \"I need to go lay down, for a little bit.\"  She uses therapy to cope. Her insight, judgment, and impulse control were poor..      Informed Consent and Assessment Methods  Explained the crisis assessment process, including applicable information disclosures and limits to confidentiality, assessed understanding of the process, and obtained consent to proceed with the assessment.  Assessment methods included conducting a formal interview with patient, review of medical records, collaboration with medical staff, and obtaining relevant collateral information from family and community providers when available.  : done     History of the Crisis   Patient's prior diagnoses were alcohol use disorder.  Patient denied other substance use.  She takes her meds, as prescribed.  She wanted to go to [place] detox, stating she was there last week.  She said she was \"signed up to go through outpatient treatment at [facility].  She's been in \"a bunch\" of inpatient [facility] treatment stays.  The last time was September 2024, but she didn't name where.  She hasn't been in an IP MH unit, before.  Her PCP prescribes her meds.  She sees Mathew Nicole, therapist at [facility], and their next visit was scheduled for 3/4/25.  She stated there's no legal actions aginast her.  She works in  at [employer].  She has 3 children.  Her oldest is her son and he lives outside of the home.  She lives with her two younger daughters.    Brief Psychosocial History  Family:  Single, Children yes  Support System:  Parent(s), Children  Employment Status:  employed full-time  Source of Income:  salary/wages  Financial Environmental Concerns:  none  Current Hobbies:  other (see comments) (not stated)  Barriers in Personal Life:  other (see comments) (alcohol use disorder)    Significant Clinical History  Current Anxiety Symptoms:  panic attack  Current Depression/Trauma:  avoidance, negativistic, impaired decision " making, irritable, apathy  Current Somatic Symptoms:  sweating, flushing, shaking, somatic symptoms (abdominal pain, headache, tension), anxious  Current Psychosis/Thought Disturbance:  impulsive, anger  Current Eating Symptoms:   (none reported)  Chemical Use History:  Alcohol: Daily  Last Use:: 02/24/25  Benzodiazepines: None (Valium for withdrawal symptoms)  Opiates: None  Cocaine: None  Marijuana: None  Other Use: None   Past diagnosis:  Substance Use Disorder  Family history:  No known history of mental health or chemical health concerns  Past treatment:  Individual therapy, Residential Treatment, Other, Primary Care (detox)  Details of most recent treatment:  Therapy at [facility] with Mathew Nicole.  Other relevant history:       Have there been any medication changes in the past two weeks:  no       Is the patient compliant with medications:  yes        Collateral Information  Is there collateral information: No (alcohol use) No answer at [facility] and Annabelle Weston's, pt's parents' number [phone], message left.         Risk Assessment  [place] Suicide Severity Rating Scale Full Clinical Version:  Suicidal Ideation  Q1 Wish to be Dead (Lifetime): No  Q2 Non-Specific Active Suicidal Thoughts (Lifetime): No  Intensity of Ideation (Lifetime)  Most Severe Ideation Rating (Lifetime):  (Pt denied ever having SI)  Suicidal Behavior (Lifetime)  Actual Attempt (Lifetime): No  Has subject engaged in non-suicidal self-injurious behavior? (Lifetime): No  Interrupted Attempts (Lifetime): No  Aborted or Self-Interrupted Attempt (Lifetime): No  Preparatory Acts or Behavior (Lifetime): No    [place] Suicide Severity Rating Scale Recent:   Suicidal Ideation (Recent)  Q1 Wished to be Dead (Past Month): no  Q2 Suicidal Thoughts (Past Month): no  Level of Risk per Screen: no risks indicated  Intensity of Ideation (Recent)  Most Severe Ideation Rating (Past 1 Month):  (intensity questions not answered)  Description of Most Severe  "Ideation (Past 1 Month): \"No, I'm not suicidal,\" pt said.  Suicidal Behavior (Recent)  Actual Attempt (Past 3 Months): No  Has subject engaged in non-suicidal self-injurious behavior? (Past 3 Months): No  Interrupted Attempts (Past 3 Months): No  Aborted or Self-Interrupted Attempt (Past 3 Months): No  Preparatory Acts or Behavior (Past 3 Months): No    Environmental or Psychosocial Events: challenging interpersonal relationships, other life stressors, impulsivity/recklessness, ongoing abuse of substances  Protective Factors: Protective Factors: strong bond to family unit, community support, or employment    Does the patient have thoughts of harming others? Feels Like Hurting Others: no  Previous Attempt to Hurt Others: no  Is the patient engaging in sexually inappropriate behavior?: no  Does Patient have a known history of aggressive behavior: No  Has aggression occurred as a result of MH concerns/diagnosis: no  Does patient have history of aggression in hospital: no    Is the patient engaging in sexually inappropriate behavior?  no        Mental Status Exam   Affect: Blunted  Appearance: Appropriate, Disheveled  Attention Span/Concentration: Attentive  Eye Contact: Variable    Fund of Knowledge: Appropriate   Language /Speech Content: Fluent  Language /Speech Volume: Normal  Language /Speech Rate/Productions: Articulate  Recent Memory: Variable  Remote Memory: Variable  Mood: Anxious, Irritable  Orientation to Person: Yes   Orientation to Place: Yes  Orientation to Time of Day: Yes  Orientation to Date: Yes     Situation (Do they understand why they are here?): Yes  Psychomotor Behavior: Normal  Thought Content: Clear  Thought Form: Intact     Medication  Psychotropic medications:   Medication Orders - Psychiatric (From admission, onward)      Start     Dose/Rate Route Frequency Ordered Stop    02/25/25 0008  diazepam (VALIUM) tablet 5-20 mg         5-20 mg Oral EVERY 30 MIN PRN 02/25/25 0008 02/24/25 2200  " traZODone (DESYREL) tablet 100 mg         100 mg Oral AT BEDTIME 02/24/25 2108 02/24/25 2110  busPIRone (BUSPAR) tablet 5 mg         5 mg Oral 3 TIMES DAILY 02/24/25 2108 02/24/25 2107  hydrOXYzine HCl (ATARAX) tablet 50 mg         50 mg Oral 3 TIMES DAILY PRN 02/24/25 2108 02/24/25 1555  nicotine (NICODERM CQ) 21 MG/24HR 24 hr patch 1 patch         1 patch  over 24 Hours Transdermal ONCE 02/24/25 1553      02/24/25 1553  nicotine polacrilex (NICORETTE) gum 4 mg        Note to Pharmacy: DO NOT USE THIS FIELD FOR ADMIN INSTRUCTIONS; INFORMATION DOES NOT SHOW ON MAR. USE THE FIELD ABOVE MARKED ADMIN INSTRUCTIONS    4 mg Buccal EVERY 1 HOUR PRN 02/24/25 1553               Current Care Team  Patient Care Team:  Junior Crane MD as PCP - Chula Thomas MD as Resident (Student in [facility] health care education/training program)  Junior Crane MD as Assigned PCP    Diagnosis  Patient Active Problem List   Diagnosis Code    CARDIOVASCULAR SCREENING; LDL GOAL LESS THAN 160 Z13.6    Chemical dependency (H) F19.20    Anxiety F41.9       Primary Problem This Admission  Active Hospital Problems    *Anxiety        Clinical Summary and Substantiation of Recommendations   Clinical Substantiation:  After therapeutic assessment, intervention and aftercare planning by ED care team and LMHP and in consultation with attending provider, the patient's circumstances and mental state were appropriate for detox and outpatient management. It is the recommendation of this clinician that pt discharge with OP MH support, upon [facility]'s recommendations. At this time the pt is not presenting as an acute risk to self or others due to the following factors: Pt denied SI, NSSI, and HI.  She because calm and cooperative, as she sobered.  She denied psychosis and juve.    Treatment Objectives Addressed:  assessing safety, identifying treatment goals    Therapeutic Interventions:  Engaged  in safety planning    Has a specific means been identified for suicidal/homicide actions: No    Patient coping skills attempted to reduce the crisis:  sleep    Disposition  Recommended referrals: Individual Therapy, Other. please comment (detox)        Reviewed case and recommendations with attending provider. Attending Name: Hai Boles MD       Attending concurs with disposition: yes       Patient and/or validated legal guardian concurs with disposition:   yes       Final disposition:  discharge (to detox on [facility])             Legal status: Voluntary/Patient has signed consent for treatment                                                                                                                                 Reviewed court records: yes       Assessment Details   Total duration spent with the patient: 30 min     CPT code(s) utilized: 76418 - Psychotherapy for Crisis - 60 (30-74*) min    Nicky Wright [facility], Psychotherapist  DEC - Triage & Transition Services  Callback: [phone]

## 2025-02-25 NOTE — PROGRESS NOTES
[address]     Case Management Encounter: Met with team, discussed patient progress, discharge plan and any impediments to discharge.    Pt was not feeling well enough to engage in discharge and aftercare planning. CM will follow up 2/26/25.    Per chart review pt may be interested in attend IOP at [facility].     Insurance: [facility]     Legal Status:  Voluntary   County:   File Number: NA  Start and expiration date of commitment: NA    SUDs Assessment Status: Will need for placement     ROIs on file: None     Living Situation: Lives in her own home with 2 daughters.     Current Providers, Supports & Collateral:  [facility] Therapist Mathew Nicole    Current Plan/Referral Status: Unknown at this time.     Safety Plan Status: In progress      MADDIE Francis  [address] - Adult Inpatient Addiction Psychiatry Unit

## 2025-02-25 NOTE — PLAN OF CARE
Behavioral Team Discussion: (2/25/2025)    Continued Stay Criteria/Rationale: Patient admitted for Chemical Use Issues.  Plan: The following services will be provided to the patient; psychiatric assessment, medication management, therapeutic milieu, individual and group support, and skills groups.   Participants: 3A Provider: Dr. Eh Denny MD; 3A RN: Neftaly Lyons RN; 3A CM's:  MADDIE Francis .  Summary/Recommendation: Providers will assess today for treatment recommendations, discharge planning, and aftercare plans. CM will meet with pt for discharge planning.   Medical/Physical: PTdenies seizures/DT's.  Denies acute medical complaints.   Precautions:   Behavioral Orders   Procedures    Code 1 - Restrict to Unit    Routine Programming     As clinically indicated    Status 15     Every 15 minutes.    Withdrawal precautions     Rationale for change in precautions or plan: N/A  Progress: Initial.    ASAM Dimension Scale Ratings:  Dimension 1: 3 Client tolerates and rohith with withdrawal discomfort poorly. Client has severe intoxication, such that the client endangers self or others, or intoxication has not abated with less intensive levels of services. Client displays severe signs and symptoms; or risk of severe, but manageable withdrawal; or withdrawal worsening despite detox at less intensive level.  Dimension 2: 1 Client tolerates and rohith with physical discomfort and is able to get the services that the client needs.  Dimension 3: 2 Client has difficulty with impulse control and lacks coping skills. Client has thoughts of suicide or harm to others without means; however, the thoughts may interfere with participation in some treatment activities. Client has difficulty functioning in significant life areas. Client has moderate symptoms of emotional, behavioral, or cognitive problems. Client is able to participate in most treatment activities.  Dimension 4: 2 Client displays verbal compliance, but lacks  consistent behaviors; has low motivation for change; and is passively involved in treatment.  Dimension 5: 4 No awareness of the negative impact of mental health problems or substance abuse. No coping skills to arrest mental health or addiction illnesses, or prevent relapse.  Dimension 6: 3 Client is not engaged in structured, meaningful activity and the client's peers, family, significant other, and living environment are unsupportive, or there is significant criminal justice system involvement.

## 2025-02-25 NOTE — PROGRESS NOTES
"  Problem: Alcohol intoxication  Signs and symptoms of listed problems will be absent or manageable.   Situation:  Voluntary admit for etoh withdrawal   Background:  Substance use history: Alcohol, Cigarettes  Mental health history: Anxiety per patient  Medical history:None  Legal history:Voluntary  Treatment history:  Has Never been in detox   Living situation: Lives with son  Recent life stressors: None  Assessment:  Notable labs: Etoh 0.304   Pt is a 50 year old female that came to ED yesterday seeking [facility] Pt reports hx of Anxiety and medical conditions. Pt denies any history of Seizure/Dts/any use of recreational drugs. Pt reports been drinking 15 IPAs daily before changing to hard liqour. Alert and oriented X 3, endorses anxiety 2/10 and depression 0/10. Pt denies SI/SIB/HI/HA and pain. Affect is flat and restricted, mantains  eye contact, with clear coherent non-pressured speech with no difficult with concentration. Pt appeared disheveled with steady gait. Pt states, \"when am upset I do nothing.\" Pt's MSSA score was 7.    BP 92/62 (BP Location: Right arm, Patient Position: Sitting, Cuff Size: Adult Regular)   Pulse 97   Temp 97.9  F (36.6  C) (Oral)   Resp 16   Ht 1.613 m (5' 3.5\")   Wt 44 kg (97 lb)   SpO2 95%   BMI 16.91 kg/m       Requests/Recommendations:  Alcohol withdrawal monitoring, Dr. maxwell evaluate  Allergies to NKDA  MSSA protocol with Valium.  Routine labs including:  B12, lipid panel,    Comfort meds including zofran, immodium, trazadone, Hydroxyzine, multivit, thiamine, folic acid, maalox, senna,nicotine replacement    "

## 2025-02-25 NOTE — PLAN OF CARE
"Goal Outcome Evaluation:    Plan of Care Reviewed With: patient Plan of Care Reviewed With: patient    Overall Patient Progress: improvingOverall Patient Progress: improving    Outcome Evaluation: Remains in alcohol withdrawal monitoring requiring medication to treat.    MSSA scores today are 14 and 10. 10mg valium administered x 2. Total valium administered since arrival to the hospital = 30mg. Pt is mildly tremulous, mildly sweaty, reports an overall poor breakfast appetite and poor appetite with lunch. She denies hallucinations. Vitals are WNL.       Pt reports anxiety rated 10 of 10 in severity and denies depression. Endorses feeling loneliness, powerlessness and sadness along with being \"slightly\" hopeful and happy. Presents as flat in affect and irritable but cooperative during assessments. Pt denies any suicidal ideation plans or intent. Endorses willingness to alert staff if she develops any SI/SIB while hospitalized (contracts for safety).     Pt reports plan for after detox as she is already set up for treatment at [facility] and [facility] for IOP.     Pt reports no further concerns at this time.     Vitals:    02/25/25 0320 02/25/25 0811 02/25/25 0852 02/25/25 1218   BP: 92/62 125/81 93/60 114/73   BP Location: Right arm   Right arm   Patient Position: Sitting      Cuff Size: Adult Regular      Pulse: 97 (!) 127 88 91   Resp: 16 16  16   Temp: 97.9  F (36.6  C) 98.3  F (36.8  C)  98.5  F (36.9  C)   TempSrc: Oral Oral  Oral   SpO2: 95% 94% 97%    Weight: 44 kg (97 lb)      Height: 1.613 m (5' 3.5\")            "

## 2025-02-25 NOTE — PROGRESS NOTES
02/25/25 0336   Patient Belongings   Did you bring any home meds/supplements to the hospital?  No   Patient Belongings other (see comments)   Patient Belongings Put in Hospital Secure Location (Security or Locker, etc.) other (see comments)   Belongings Search Yes   Clothing Search Yes   Second Staff Curtis RICHARD     Storage bin: pillowcase, pillow, boots, shorts, sweatshirt (no strings)  Drugs destroyed per policy  Note: patient had no phone or wallet  A               Admission:  I am responsible for any personal items that are not sent to the safe or pharmacy.  [facility] is not responsible for loss, theft or damage of any property in my possession.    Signature:  _________________________________ Date: _______  Time: _____                                              Staff Signature:  ____________________________ Date: ________  Time: _____      2nd Staff person, if patient is unable/unwilling to sign:    Signature: ________________________________ Date: ________  Time: _____     Discharge:  [facility] has returned all of my personal belongings:    Signature: _________________________________ Date: ________  Time: _____                                          Staff Signature:  ____________________________ Date: ________  Time: _____
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
## 2025-02-25 NOTE — DISCHARGE INSTRUCTIONS
Behavioral Discharge Planning and Instructions  THANK YOU FOR CHOOSING [address]  [phone]    Summary: You were admitted to [facility] on 2/24/2025 for detoxification from alcohol.  A medical exam was performed that included lab work. You have met with a [facility] Counselor and opted to attend intensive outpatient JESSY treatment and therapy.  Please take care and make your recovery a daily priority, Janeal!  It was a pleasure working with you and the entire treatment team here wishes you the very best in your recovery!     Recommendation:  Please attend intensive outpatient treatment at [facility] and follow any and all recommendations. Please abstain from the use of any mood altering chemicals.    [facility]  [address] [facility] [address]  [phone]    Main Diagnoses:    303.90 (F10.20) Alcohol Use Disorder Severe    Major Treatments, Procedures and Findings:  You were treated for alcohol detoxification using valium administered based on the [facility] protocol. You have met with a [facility] counselor to develop a treatment plan for discharge. You had labs drawn and those results were reviewed with you. Please take a copy of your lab work with you to your next primary care provider appointment.    Symptoms to Report:  If you experience more anxiety, confusion, sleeplessness, deep sadness or thoughts of suicide, notify your treatment team or notify your primary care provider. IF ANY OF THE SYMPTOMS YOU ARE EXPERIENCING ARE A MEDICAL EMERGENCY CALL 911 IMMEDIATELY.     Lifestyle Adjustment: Adjust your lifestyle to get enough sleep, relaxation, exercise and good nutrition. Continue to develop healthy coping skills to decrease stress and promote a sober living environment. Do not use mood altering substances including alcohol, illegal drugs or addictive medications other than what is currently prescribed.     Disposition: Return home and attend [facility] JESSY treatment.     Facts  about COVID19 at www.cdc.gov/COVID19 and www.[place].gov/covid19    Keeping hands clean is one of the most important steps we can take to avoid getting sick and spreading germs to others.  Please wash your hands frequently and lather with soap for at least 20 seconds!    Follow-up Appointment:   Appointment Date/Time: ***    Psychiatrist/Primary Care Giver: ***    Address: ***    Phone Number: ***      Recovery apps for your phone for educational purposes and to locate in person and zoom recovery meetings  Everything AA -  zoë is a great resource  12 Step Toolkit - educational purpose learning about the 12 steps to recovery  [place] [employer] - meeting zoë  AA  - meeting zoë  Meeting guide - meeting zoë  Quick NA meeting - meeting zoë  Melissahuong- has various apps    Patient Navigation Hub  [facility] Navigators work to be your point-of-contact for trustworthy and compassionate care from Inpatient services to [facility] Programmatic Care. We will provide resources and communication to help guide you into programmatic care. Ultimately, our goal is to be the one-stop-shop of communication, coordination, and support for your journey to programmatic care.  Phone: [phone]    Resources:  AA/NA meetings have returned to in-person or a hybrid combination of zoom/in-person therefore please check online to verify*  Need Support Now? If you or someone you know is struggling or in crisis, help is available. Call or text 988 or chat 988[employer] SARLline.org  AA meetings search for them at: https://aa-intergroup.org (worldwide meeting listings)  AA meetings for [place] area can be found online at: https://[place].org (click local online meetings listings)  NA meetings for [place] area can be found online at: https://www.nam[place].org  (click find a meeting)  AA and NA Sponsors are excellent resources for support and you can find one at any support group meeting.   Alcoholics Anonymous (https://aa.org/): for information 24  "hours/day  AA Intergroup service office in [place] (http://www.aastpaul.org/) 790.271.1726  AA Intergroup service office in [facility]: 717.614.2861. (http://www.a[place].org/)  Narcotics Anonymous (www.nam[place].org) (166) 684-3860  https://aafairviewriverside.org/meetings  SMART Recovery - self management for addiction recovery:  www.smartrecovery.org  Pathways ~ A Health Crisis Resource & Support Center:  702.710.9038.  https://prescribetoprevent.org/patient-education/videos/  http://www.harmreduction.org  Crisis Text Line  Text 874490  You will be connected with a trained live crisis counselor to provide support. Por espanol, texto  MARI a 903219 o texto a 442-AYUDAME en WhatsApp  [place] Hope Line  1.730.SUICIDE [4705027]  [facility] 874-387-9434  Support Group:  AA/NA and Sponsor/support.  Fast Tracker  Linking people to mental health and substance use disorder resources  Cloudy DaysckChoreMonstern.org   [place] Mental Health Warm Line  Peer to peer support  Monday thru Saturday, 12 pm to 10 pm  303.226.5263 or 3.914.709.0404  Text \"Support\" to 07947  National [place] on Mental Illness (KEVIN)  273.133.6684 or 1.888.KEVIN.HELPS  Alcoholics Anonymous (www.alcoholics-anonymous.org): Check your phone book for your local chapter.  Suicide Awareness Voices of Education (SAVE) (www.save.org): 743-491-TBUX (7283)  National Suicide Prevention Line (www.mentalhealthmn.org): 202-256-DRYL (1943)  Mental Health Consumer/Survivor Network of [place] (www.mhcsn.net): 721.761.4661 or 574-342-6339  Mental Health Association of [place] (www.mentalhealth.org): 207.750.9380 or 909-757-6550   Substance Abuse and Mental Health Services (www.samhsa.gov)  [place] Opioid Prevention Coalition: www.opioidcoalition.org    [place] Recovery Connection (MRC)  [facility] connects people seeking recovery to resources that help foster and sustain long-term recovery.  Whether you are seeking resources for treatment, " transportation, housing, job training, education, health care or other pathways to recovery, [facility] is a great place to start.  332.198.1519.  www.[facility] Cent[facility].org    Great Pod casts for nutrition and wellness  Listen on Apple Podcasts  Dishing Up Nutrition   [employer]   Nutrition       Understand the connection between what you eat and how you feel. Hosted by licensed nutritionists and dietitians from [employer] we share practical, real-life solutions for healthier living through nutrition.     General Medication Instructions:   See your medication sheet(s) for instructions.   Take all medications as prescribed.  Make no changes unless your primary care provider suggests them.   Go to all your primary care provider visits.  Be sure to have all your required lab tests. This way, your medicines can be refilled on time.  Do not use any forms of alcohol.    Please Note: If you have any questions at anytime after you discharged please call [facility] detox unit 3A at [phone].    Here are a list of additional numbers you can call if you are wanting to resume services through [facility]:  [facility] Assessment Intake: [phone]  [facility] Adult JESSY Intensive Outpatient  call: [phone]  [facility] Admissions [phone]    Recovery Clinic call: [phone]  [facility]: [phone]  Medical Records call: [phone]  Billing Department call: [phone]    Please remember to take all of your behavioral discharge planning and lab paperwork to any follow up appointments, it contains your lab results, diagnosis, medication list and discharge recommendations.      THANK YOU FOR CHOOSING [facility]

## 2025-02-26 VITALS
BODY MASS INDEX: 16.56 KG/M2 | TEMPERATURE: 97.3 F | OXYGEN SATURATION: 100 % | HEIGHT: 64 IN | WEIGHT: 97 LBS | DIASTOLIC BLOOD PRESSURE: 71 MMHG | RESPIRATION RATE: 16 BRPM | HEART RATE: 76 BPM | SYSTOLIC BLOOD PRESSURE: 104 MMHG

## 2025-02-26 PROCEDURE — 250N000013 HC RX MED GY IP 250 OP 250 PS 637: Performed by: STUDENT IN AN ORGANIZED HEALTH CARE EDUCATION/TRAINING PROGRAM

## 2025-02-26 PROCEDURE — 99239 HOSP IP/OBS DSCHRG MGMT >30: CPT | Performed by: NURSE PRACTITIONER

## 2025-02-26 PROCEDURE — 250N000013 HC RX MED GY IP 250 OP 250 PS 637: Performed by: NURSE PRACTITIONER

## 2025-02-26 PROCEDURE — 250N000013 HC RX MED GY IP 250 OP 250 PS 637: Performed by: PSYCHIATRY & NEUROLOGY

## 2025-02-26 RX ORDER — NICOTINE 21 MG/24HR
1 PATCH, TRANSDERMAL 24 HOURS TRANSDERMAL EVERY 24 HOURS
Qty: 28 PATCH | Refills: 1 | Status: SHIPPED | OUTPATIENT
Start: 2025-02-26

## 2025-02-26 RX ORDER — MULTIPLE VITAMINS W/ MINERALS TAB 9MG-400MCG
1 TAB ORAL DAILY
Qty: 30 TABLET | Refills: 0 | Status: SHIPPED | OUTPATIENT
Start: 2025-02-26

## 2025-02-26 RX ORDER — TRAZODONE HYDROCHLORIDE 100 MG/1
100 TABLET ORAL
Qty: 30 TABLET | Refills: 1 | Status: SHIPPED | OUTPATIENT
Start: 2025-02-26

## 2025-02-26 RX ORDER — LANOLIN ALCOHOL/MO/W.PET/CERES
100 CREAM (GRAM) TOPICAL DAILY
Qty: 30 TABLET | Refills: 0 | Status: SHIPPED | OUTPATIENT
Start: 2025-02-26

## 2025-02-26 RX ORDER — BUSPIRONE HYDROCHLORIDE 5 MG/1
5 TABLET ORAL 3 TIMES DAILY
Status: SHIPPED
Start: 2025-02-26

## 2025-02-26 RX ORDER — NICOTINE 21 MG/24HR
1 PATCH, TRANSDERMAL 24 HOURS TRANSDERMAL DAILY
Status: DISCONTINUED | OUTPATIENT
Start: 2025-02-26 | End: 2025-02-26 | Stop reason: HOSPADM

## 2025-02-26 RX ORDER — FOLIC ACID 1 MG/1
1 TABLET ORAL DAILY
Qty: 30 TABLET | Refills: 0 | Status: SHIPPED | OUTPATIENT
Start: 2025-02-26

## 2025-02-26 RX ADMIN — THIAMINE HCL TAB 100 MG 100 MG: 100 TAB at 09:07

## 2025-02-26 RX ADMIN — NICOTINE 1 PATCH: 21 PATCH, EXTENDED RELEASE TRANSDERMAL at 09:25

## 2025-02-26 RX ADMIN — BACLOFEN 10 MG: 10 TABLET ORAL at 09:08

## 2025-02-26 RX ADMIN — ACETAMINOPHEN 650 MG: 325 TABLET, FILM COATED ORAL at 04:13

## 2025-02-26 RX ADMIN — Medication 1 TABLET: at 09:07

## 2025-02-26 RX ADMIN — FOLIC ACID 1 MG: 1 TABLET ORAL at 09:07

## 2025-02-26 RX ADMIN — HYDROXYZINE HYDROCHLORIDE 25 MG: 25 TABLET, FILM COATED ORAL at 12:17

## 2025-02-26 RX ADMIN — BUSPIRONE HYDROCHLORIDE 5 MG: 5 TABLET ORAL at 09:07

## 2025-02-26 RX ADMIN — NICOTINE POLACRILEX 4 MG: 4 GUM, CHEWING BUCCAL at 09:07

## 2025-02-26 RX ADMIN — BUSPIRONE HYDROCHLORIDE 5 MG: 5 TABLET ORAL at 14:08

## 2025-02-26 ASSESSMENT — ACTIVITIES OF DAILY LIVING (ADL)
ADLS_ACUITY_SCORE: 15
ORAL_HYGIENE: INDEPENDENT
ADLS_ACUITY_SCORE: 15
DRESS: SCRUBS (BEHAVIORAL HEALTH)
ADLS_ACUITY_SCORE: 15
LAUNDRY: WITH SUPERVISION
ADLS_ACUITY_SCORE: 15
HYGIENE/GROOMING: INDEPENDENT
ADLS_ACUITY_SCORE: 15

## 2025-02-26 NOTE — PROGRESS NOTES
Pt was admitted for alcohol detox. She was monitored and treated using the MSSA protocol with prn valium.  Pt has not had any medication for withdrawal, will be taken out of detox and discharge home today.     Talked to pt about follow up appt, said she already talked to the clinic, the provider is only there on Wednesdays, she will call them to setup her appointment when she gets home.    Pt discharge education done. She verbalized understanding of instructions including med changes and recommended follow up care as noted above, to make an appointment to see her primary provider. Written discharge instructions/AVS given. Pt was instructed to bring AVS to all appointments as it includes updated med list and current lab values.    Discharge meds given to patient, she did get back all personal belongings which did not include a phone or wallet as pt said she did not bring any of these to the hospital.   No learning barriers identified. Denied SI or HI. No anxiety or pain.  She left the unit with a staff.

## 2025-02-26 NOTE — PLAN OF CARE
"Goal Outcome Evaluation:    Plan of Care Reviewed With: patient Plan of Care Reviewed With: patient    Overall Patient Progress: improvingOverall Patient Progress: improving    Outcome Evaluation: remains in alcohol withdrawal monitoring not meeting threshold to treat with valium.    MSSA scores  today are 3 and 4. No valium administered. Total valium administered since arrival to the hospital = 40mg with last valium administered yesterday at 1628. Pt is not tremulous, mildly sweaty, reports an overall full appetite and denies hallucinations. Vitals are WNL.       Pt reports anxiety \"less than the last couple days\" and rated this 5 of 10 in severity. Pt denies depression. Endorses feeling hopeful and happiness but also states \"things could be better\". Pt denies any suicidal ideation plans or intent. Endorses willingness to alert staff if she develops any SI/SIB while hospitalized (contracts for safety).     Pt reports plan for after detox as return home and attend IOP. Pt plans to discharge tonight once she is out of monitoring and states she has an insurance ride arranged to pick her up at 1700.     Pt reports no further concerns at this time.     Vitals:    02/26/25 0000 02/26/25 0419 02/26/25 0804 02/26/25 1109   BP: 92/61 89/65 105/72 130/68   BP Location:   Left arm Left arm   Patient Position: Supine Sitting Sitting    Cuff Size: Adult Regular Adult Regular Adult Small    Pulse: 74 70 65 70   Resp: 16 16 14 16   Temp: 97.5  F (36.4  C) 96.9  F (36.1  C) 97  F (36.1  C) 97.2  F (36.2  C)   TempSrc: Oral Tympanic Tympanic Temporal   SpO2:   100%    Weight:       Height:           "

## 2025-02-26 NOTE — PLAN OF CARE
Goal Outcome Evaluation:    Plan of Care Reviewed With: patient      Problem: Alcohol Withdrawal  Goal: Alcohol Withdrawal Symptom Control  Outcome: Progressing     Pt continues to be monitored for alcohol withdrawal symptoms. Reports high anxiety, heart rate tachy, had tremors at finger tips. Scored and got some valium at 1600.   Ate dinner, spent the evening participating in unit activities.   No SI. Got prn imodium for diarrhea.     MSSA: 8 and 7, mostly due to elevated pulse.     B/P: 109/77,  P:108, P:118  B/P: 109/73, T: 98.2, P: 129, R: 16, atenolol given

## 2025-02-26 NOTE — PROVIDER NOTIFICATION
02/26/25 0600   Sleep/Rest   Night Time # Hours 6.75 hours     Pt had a quiet night with no behavioral or safety concerns. Pt was observed sleeping the entire night, no acute events noted or reported. Received prn Tylenol for backache rated 8/10. MSSA 3,3.

## 2025-02-26 NOTE — DISCHARGE SUMMARY
"Psychiatric Discharge Summary    Dontae Weston MRN# [id number]   Age: 50 year old YOB: 1975     Date of Admission:  2/24/2025  Date of Discharge:  2/26/2025  Admitting Physician:  Eh Denny MD  Discharge Physician:  CHINO Elliott CNP (Contact: [phone])         Event Leading to Hospitalization:      From H&P 2/25/2025:    Dontae Weston is a 50-year-old female admitted to [address] on 2/24/2025.  She was admitted as a voluntary patient through the ED due to alcohol use disorder and withdrawal.  She was consuming 15 IPAs daily.  She was occasionally consuming liquor in addition to this.  In the ED, she was initially irritable and made racist statements but was later calm.  Family reported concerns that she was making suicidal statements.  She reported that she could not recall making suicidal statements and denied suicidal ideation.  BAL was 0.37.  UTOX was positive for benzodiazepines.  She reports taking medications as prescribed.      She reports her mood is \"fine.\" She denies feeling sad or anxious. She does report feeling overwhelmed and irritable, concerned about losing her job. Her sleep is \"okay.\" She said she eats 1-2 meals per day and has not had any recent weight changes. She reports concentration is impaired. Energy and motivation are \"okay.\" She denies suicidal and homicidal ideation. She has a history of abuse but denies PTSD symptoms. She denies symptoms consistent with psychosis and juve.     See full admission note by Nae Castellon NP 2/25/2025 for details.         Diagnoses:     Alcohol use disorder, severe, dependence  Alcohol withdrawal, resolved  Nicotine use disorder  Generalized anxiety disorder         Labs:      Latest Reference Range & Units 02/24/25 16:15 02/24/25 16:55 02/24/25 21:28 02/25/25 03:00   Sodium 135 - 145 mmol/L 144      Potassium 3.4 - 5.3 mmol/L 3.9      Chloride 98 - 107 mmol/L 102      Carbon Dioxide " (CO2) 22 - 29 mmol/L 27      Urea Nitrogen 6.0 - 20.0 mg/dL 5.6 (L)      Creatinine 0.51 - 0.95 mg/dL 0.45 (L)      GFR Estimate >60 mL/min/1.73m2 >90      Calcium 8.8 - 10.4 mg/dL 9.3      Anion Gap 7 - 15 mmol/L 15      Magnesium 1.7 - 2.3 mg/dL 2.2      Albumin 3.5 - 5.2 g/dL 4.9      Protein Total 6.4 - 8.3 g/dL 8.1      Alkaline Phosphatase 40 - 150 U/L 56      ALT 0 - 50 U/L 22      AST 0 - 45 U/L 33      Bilirubin Total <=1.2 mg/dL 0.3      GGT 5 - 36 U/L 29      Glucose 70 - 99 mg/dL 85      TSH 0.30 - 4.20 uIU/mL 0.69      WBC 4.0 - 11.0 10e3/uL 4.0      Hemoglobin 11.7 - 15.7 g/dL 12.9      Hematocrit 35.0 - 47.0 % 36.6      Platelet Count 150 - 450 10e3/uL 269      RBC Count 3.80 - 5.20 10e6/uL 4.23      MCV 78 - 100 fL 87      MCH 26.5 - 33.0 pg 30.5      MCHC 31.5 - 36.5 g/dL 35.2      RDW 10.0 - 15.0 % 13.5      % Neutrophils % 49      % Lymphocytes % 39      % Monocytes % 8      % Eosinophils % 1      % Basophils % 2      Absolute Basophils 0.0 - 0.2 10e3/uL 0.1      Absolute Eosinophils 0.0 - 0.7 10e3/uL 0.0      Absolute Immature Granulocytes <=0.4 10e3/uL 0.1      Absolute Lymphocytes 0.8 - 5.3 10e3/uL 1.6      Absolute Monocytes 0.0 - 1.3 10e3/uL 0.3      % Immature Granulocytes % 2      Absolute Neutrophils 1.6 - 8.3 10e3/uL 2.0      Absolute NRBCs 10e3/uL 0.0      NRBCs per 100 WBC <1 /100 0      Color Urine Colorless, Straw, Light Yellow, Yellow     Light Yellow   Appearance Urine Clear     Clear   Glucose Urine Negative mg/dL    Negative   Bilirubin Urine Negative     Negative   Ketones Urine Negative mg/dL    Negative   Specific Gravity Urine 1.003 - 1.035     1.015   pH Urine 5.0 - 7.0     5.0   Protein Albumin Urine Negative mg/dL    Negative   Urobilinogen mg/dL Normal, 2.0 mg/dL    Normal   Nitrite Urine Negative     Negative   Blood Urine Negative     Negative   Leukocyte Esterase Urine Negative     Small !   WBC Urine <=5 /HPF    9 (H)   RBC Urine <=2 /HPF    1   Squamous Epithelial  /HPF Urine <=1 /HPF    2 (H)   Mucus Urine None Seen /LPF    Present !   Alcohol Breath Test 0.00 - 0.01   0.304 ! 0.129 !    Amphetamine Qual Urine Screen Negative     Screen Negative   Fentanyl Qual Urine Screen Negative     Screen Negative   Cocaine Urine Screen Negative     Screen Negative   Benzodiazepine Urine Screen Negative     Screen Positive !   Opiates Qualitative Urine Screen Negative     Screen Negative   PCP Urine Screen Negative     Screen Negative   Cannabinoids Qual Urine Screen Negative     Screen Negative   Barbiturates Qual Urine Screen Negative     Screen Negative   Alcohol ethyl <=0.01 g/dL 0.37 (HH)             Consults:     Internal Medicine Consult 2/25/2025:    Assessment & Plan    Dontae Weston is a 50 year old female with PMH notable for AUD, anxiety admitted on 2/24/2025 for alcohol detox. Medicine was consulted for co-management.      #Acute alcohol withdrawal  #Alcohol use disorder  MAINOR on admission 0.37.  Reports drinking at least 15 IPAs per day followed by hard liquor. Denies h/o withdrawal seizures or DTs.  Utox screen positive for benzos.  Labs on admission otherwise unremarkable.  - Management per Psychiatry  - Agree w/ MSSA w/ diazepam   - Agree w/ folate, MVI, thiamine  - PRNs for withdrawal symptom management per Psychiatry   - PTA baclofen 10mg BID (presumed for AUD tx) continued per Psychiatry     #Abnormal UA  UA on admission w/ small LE, 9 WBC, 2 squamous epithelial cells, mucus. Did not reflex to culture. Pt without urinary symptoms.   - No need for treatment     #Anxiety  On PTA buspirone 5mg TID, gabapentin 100mg TID, hydroxyzine 50mg TID PRN for anxiety, trazodone 100mg at bedtime.   - Management per Psychiatry     #Nicotine dependence: Nicotine patch     Medicine will sign off on this patient. Thank you for allowing us to participate in the care of this patient. Please do not hesitate to reach out with further questions and concerns.           Hospital Course:      Dontae Weston was admitted to [address] with attending Eh Denny MD as a voluntary patient.  The patient was placed under status 15 (15 minute checks) to ensure patient safety.     MSSA protocol was initiated due to the patient's history of alcohol abuse and concern for withdrawal symptoms.  She received 40 mg of Valium on day 1.  Thereafter her withdrawal resolved, and the MSSA was discontinued.     Baclofen 10 mg BID was continued.  She reported some interest in taking Naltrexone but would prefer to talk to her outpatient provider first.  Buspar 5 mg TID was continued.  PRNs of Trazodone and Hydroxyzine were continued.      Dontae Weston spent time in her room and did not attend groups.  She stated her intent to attend IOP at [facility].  She reported her mood was euthymic.  Anxiety was reduced.  She denied suicidal ideation.      Dontae Weston was discharged to home with plans to attend IOP at [facility].  At the time of discharge Dontae Weston was determined to not be a danger to herself or others.          Discharge Medications:       Dose / Directions   folic acid 1 MG tablet  Commonly known as: FOLVITE        Dose: 1 mg  Take 1 tablet (1 mg) by mouth daily.  Quantity: 30 tablet  Refills: 0     multivitamin w/minerals tablet        Dose: 1 tablet  Take 1 tablet by mouth daily.  Quantity: 30 tablet  Refills: 0     nicotine 21 MG/24HR 24 hr patch  Commonly known as: NICODERM CQ        Dose: 1 patch  Place 1 patch over 24 hours onto the skin every 24 hours.  Quantity: 28 patch  Refills: 1     nicotine polacrilex 4 MG gum  Commonly known as: NICORETTE        Dose: 4 mg  Place 1 each (4 mg) inside cheek every hour as needed for nicotine withdrawal symptoms.       thiamine 100 MG tablet  Commonly known as: B-1        Dose: 100 mg  Take 1 tablet (100 mg) by mouth daily.  Quantity: 30 tablet  Refills: 0       busPIRone 5 MG tablet  Commonly known as: BUSPAR      "   Dose: 5 mg  Take 1 tablet (5 mg) by mouth 3 times daily.       traZODone 100 MG tablet  Commonly known as: DESYREL        Dose: 100 mg  Take 1 tablet (100 mg) by mouth nightly as needed for sleep.  Quantity: 30 tablet  Refills: 1       baclofen 10 MG tablet  Commonly known as: LIORESAL      Dose: 10 mg  Take 10 mg by mouth 2 times daily.       hydrOXYzine marta 25 MG capsule  Commonly known as: VISTARIL      Dose: 50 mg  Take 2 capsules (50 mg) by mouth 3 times daily as needed for anxiety           These medications were sent to [facility] [address]  [address] [address]      Phone: [phone]   folic acid 1 MG tablet  multivitamin w/minerals tablet  nicotine 21 MG/24HR 24 hr patch  thiamine 100 MG tablet  traZODone 100 MG tablet         Psychiatric Examination:     Appearance:  awake, alert, adequate grooming, dressed in scrubs  Attitude:  cooperative  Eye Contact:  good  Mood:  \"better\"  Affect:  normal range  Speech:  clear, coherent  Psychomotor Behavior:  no evidence of tardive dyskinesia, dystonia, or tics  Thought Process:  linear  Associations:  no loose associations  Thought Content:  no evidence of suicidal ideation or homicidal ideation and no evidence of psychotic thought  Insight:  fair  Judgment:  fair  Oriented to:  date, time, person, and place  Attention Span and Concentration:  fair  Recent and Remote Memory:  fair  Language:  intact, fluent English  Fund of Knowledge:  appropriate  Muscle Strength and Tone:  normal  Gait and Station:  normal    BP 89/65 (Patient Position: Sitting, Cuff Size: Adult Regular)   Pulse 70   Temp 96.9  F (36.1  C) (Tympanic)   Resp 16   Ht 1.613 m (5' 3.5\")   Wt 44 kg (97 lb)   SpO2 96%   BMI 16.91 kg/m           Discharge Plan:     Per Discharge AVS:    Summary: You were admitted to [facility] on 2/24/2025 for detoxification from alcohol.  A medical exam was performed that included lab work. " You have met with a [facility] Counselor and opted to attend intensive outpatient JESSY treatment and therapy.  Please take care and make your recovery a daily priority, Janmarissa!  It was a pleasure working with you and the entire treatment team here wishes you the very best in your recovery!     Recommendation:  Please attend intensive outpatient treatment at [facility] and follow any and all recommendations. Please abstain from the use of any mood altering chemicals.    [facility]  [address] [facility] [address]  [phone]    Main Diagnoses:    303.90 (F10.20) Alcohol Use Disorder Severe    Major Treatments, Procedures and Findings:  You were treated for alcohol detoxification using valium administered based on the [facility] protocol. You have met with a [facility] counselor to develop a treatment plan for discharge. You had labs drawn and those results were reviewed with you. Please take a copy of your lab work with you to your next primary care provider appointment.    Symptoms to Report:  If you experience more anxiety, confusion, sleeplessness, deep sadness or thoughts of suicide, notify your treatment team or notify your primary care provider. IF ANY OF THE SYMPTOMS YOU ARE EXPERIENCING ARE A MEDICAL EMERGENCY CALL 911 IMMEDIATELY.     Lifestyle Adjustment: Adjust your lifestyle to get enough sleep, relaxation, exercise and good nutrition. Continue to develop healthy coping skills to decrease stress and promote a sober living environment. Do not use mood altering substances including alcohol, illegal drugs or addictive medications other than what is currently prescribed.     Disposition: Return home and attend [facility] JESSY treatment.       Attestation:  The patient has been seen and evaluated by me, CHINO Elliott CNP  Discharge time > 30 minutes

## 2025-02-26 NOTE — PROGRESS NOTES
[address]     Case Management Encounter: Met with team, discussed patient progress, discharge plan and any impediments to discharge.    Pt shared she has scheduled IOP treatment at [facility] and also will be attending individual therapy with them Pt reported she will have a ride home when discharged this afternoon.      Pt was not feeling well enough to engage in discharge and aftercare planning. CM will follow up 2/26/25.    Per chart review pt may be interested in attend IOP at [facility].     Insurance: [email or web address]     Legal Status:  Voluntary   County:   File Number: NA  Start and expiration date of commitment: NA    SUDs Assessment Status: Not needed    ROIs on file: None     Living Situation: Lives in her own home with 2 daughters.     Current Providers, Supports & Collateral:  [facility] Therapist Mathew Nicole    Current Plan/Referral Status: IOP and therapy at [facility] [place],     Safety Plan Status: Completed      MADDIE Francis  [address] - Adult Inpatient Addiction Psychiatry Unit

## 2025-02-28 ENCOUNTER — MYC MEDICAL ADVICE (OUTPATIENT)
Dept: FAMILY MEDICINE | Facility: CLINIC | Age: 50
End: 2025-02-28
Payer: COMMERCIAL

## 2025-03-06 ENCOUNTER — MYC MEDICAL ADVICE (OUTPATIENT)
Dept: FAMILY MEDICINE | Facility: CLINIC | Age: 50
End: 2025-03-06
Payer: COMMERCIAL

## 2025-03-06 ENCOUNTER — TELEPHONE (OUTPATIENT)
Dept: FAMILY MEDICINE | Facility: CLINIC | Age: 50
End: 2025-03-06
Payer: COMMERCIAL

## 2025-03-06 NOTE — TELEPHONE ENCOUNTER
I will not be able to fill out the form for 2 weeks.  If it is a FMLA she will have to disclose the medical condition so I would have the inpatient treatment center to do the paperwork. If it is just a letter that she needs I can write one.

## 2025-03-06 NOTE — TELEPHONE ENCOUNTER
Patient calling to report that she missed her appointment with Dr Crane yesterday because she was in detox. She will be in detox for a couple days and then go to in-patient treatment.     She is calling to let PCP know that she can get this time paid for by her employer if a form can be filled out. She is wondering if PCP will fill it out for her. Patient will be uploading the form to her MyChart.     Patient is unreachable in detox for a couple days but then can get her phone back when she arrives at in-patient facility.  The phone number for the in-patient facility is [phone] ext [phone].    FYI to TC that form will be arriving.     Diane YODERN, RN

## 2025-03-06 NOTE — TELEPHONE ENCOUNTER
RUFINO, I did send her a Docint message that you are out of the office until 3/19/25.Narcisa FRANCISCO [facility]

## 2025-03-07 ENCOUNTER — TELEPHONE (OUTPATIENT)
Dept: FAMILY MEDICINE | Facility: CLINIC | Age: 50
End: 2025-03-07
Payer: COMMERCIAL

## 2025-03-07 NOTE — TELEPHONE ENCOUNTER
Sent work excuse letter via [employer] in separate encounter.  Thank you,  Kimberly HERNDON    [phone]

## 2025-03-07 NOTE — TELEPHONE ENCOUNTER
Forms/Letter Request    Type of form/letter: Disability      Is Release of Information needed?: Yes  Was an KISHA obtained?  No- according to form, patient will bring in a signed KISHA    Do we have the form/letter: Yes: placed in providers basket to complete    Who is the form from? [employer]    Where did/will the form come from? form was faxed in    When is form/letter needed by: asap    How would you like the form/letter returned:  unless patient brings KISHA allowing us to fax form    Patient Notified form requests are processed in 5-7 business days:No    Could we send this information to you in [employer] or would you prefer to receive a phone call?:

## 2025-03-07 NOTE — LETTER
March 7, 2025      Dontae Weston  [address] [address]        To Whom It May Concern:    Dontae Weston is unable to work until after April 4,2025      Sincerely,           Electronically signed    Junior Crane MD

## 2025-03-07 NOTE — TELEPHONE ENCOUNTER
Printed attachment and placed in your basket  Placed FMLA form in your basket as well.    Per Artem message below- Can he write a note confirming I am exused from the last date through April 4 for mental health/medical condition and I will see if they can fill it out?     Please advise.  Thank you,  Kimberly HERNDON    [phone]

## 2025-03-13 NOTE — TELEPHONE ENCOUNTER
Printed attached form and placed in providers basket  Please route to TC when complete.  Thank you,  Kimberly HERNDON    [phone]

## 2025-03-14 ENCOUNTER — MYC MEDICAL ADVICE (OUTPATIENT)
Dept: FAMILY MEDICINE | Facility: CLINIC | Age: 50
End: 2025-03-14
Payer: COMMERCIAL

## 2025-03-14 NOTE — LETTER
February 12, 2025        Dontae Weston  [address] [facility] [address]           To Whom It May Concern:     Dontae Weston was seen in our clinic by a video visit. She may return to work February 25/2025. Excuse from 2/14/2025 through the 2/24/2025.     Sincerely,    Junior Crane MD

## 2025-03-19 NOTE — TELEPHONE ENCOUNTER
Can you change the date on the note? It has 2/19/25. Do you want to send her a message about you not being able to complete the form? Or, do you want me to?.Narcisa FRANCISCO [facility]

## 2025-03-19 NOTE — TELEPHONE ENCOUNTER
Faxed Dr Rodrigues's notes from 2/12/25 and 2/19/25 to [phone].Narcisa Rodriguez [facility]

## 2025-03-20 NOTE — TELEPHONE ENCOUNTER
Patient was informed that Dr Crane cannot fill out the FMLA form. Patient had this filled out by the facility she is at.Narcisa Rodriguez [facility]

## 2025-03-29 ENCOUNTER — PATIENT OUTREACH (OUTPATIENT)
Dept: CARE COORDINATION | Facility: CLINIC | Age: 50
End: 2025-03-29
Payer: COMMERCIAL

## 2025-03-31 ENCOUNTER — MYC MEDICAL ADVICE (OUTPATIENT)
Dept: FAMILY MEDICINE | Facility: CLINIC | Age: 50
End: 2025-03-31
Payer: COMMERCIAL

## 2025-03-31 NOTE — LETTER
April 2, 2025      Dontae Weston  [address] [place] [facility] [address]        To Whom It May Concern:    Dontae Weston has had a leave from work recently and a letter had been sent. The reason for the leave was for mental health issues and substance abuse  Sincerely,        Junior Crane MD    Electronically signed

## 2025-04-01 ASSESSMENT — ANXIETY QUESTIONNAIRES
3. WORRYING TOO MUCH ABOUT DIFFERENT THINGS: SEVERAL DAYS
5. BEING SO RESTLESS THAT IT IS HARD TO SIT STILL: NOT AT ALL
2. NOT BEING ABLE TO STOP OR CONTROL WORRYING: SEVERAL DAYS
GAD7 TOTAL SCORE: 5
1. FEELING NERVOUS, ANXIOUS, OR ON EDGE: SEVERAL DAYS
6. BECOMING EASILY ANNOYED OR IRRITABLE: SEVERAL DAYS
IF YOU CHECKED OFF ANY PROBLEMS ON THIS QUESTIONNAIRE, HOW DIFFICULT HAVE THESE PROBLEMS MADE IT FOR YOU TO DO YOUR WORK, TAKE CARE OF THINGS AT HOME, OR GET ALONG WITH OTHER PEOPLE: SOMEWHAT DIFFICULT
7. FEELING AFRAID AS IF SOMETHING AWFUL MIGHT HAPPEN: NOT AT ALL
8. IF YOU CHECKED OFF ANY PROBLEMS, HOW DIFFICULT HAVE THESE MADE IT FOR YOU TO DO YOUR WORK, TAKE CARE OF THINGS AT HOME, OR GET ALONG WITH OTHER PEOPLE?: SOMEWHAT DIFFICULT
7. FEELING AFRAID AS IF SOMETHING AWFUL MIGHT HAPPEN: NOT AT ALL
GAD7 TOTAL SCORE: 5
4. TROUBLE RELAXING: SEVERAL DAYS
GAD7 TOTAL SCORE: 5

## 2025-04-02 ENCOUNTER — VIRTUAL VISIT (OUTPATIENT)
Dept: FAMILY MEDICINE | Facility: CLINIC | Age: 50
End: 2025-04-02
Payer: COMMERCIAL

## 2025-04-02 DIAGNOSIS — F19.20 CHEMICAL DEPENDENCY (H): Primary | ICD-10-CM

## 2025-04-02 DIAGNOSIS — F41.9 ANXIETY: ICD-10-CM

## 2025-04-02 PROCEDURE — 98005 SYNCH AUDIO-VIDEO EST LOW 20: CPT | Performed by: FAMILY MEDICINE

## 2025-04-02 RX ORDER — BUSPIRONE HYDROCHLORIDE 5 MG/1
5 TABLET ORAL DAILY
COMMUNITY
Start: 2025-04-02 | End: 2025-04-02

## 2025-04-02 NOTE — PROGRESS NOTES
Dontae is a 50 year old who is being evaluated via a billable video visit.    How would you like to obtain your AVS? MyChart  If the video visit is dropped, the invitation should be resent by:   Will anyone else be joining your video visit? No        ICD-10-CM    1. Anxiety  F41.9 busPIRone (BUSPAR) 5 MG tablet       PLAN:out patient therapy     Subjective   Dontae is a 50 year old, presenting for the following health issues:  RECHECK (Mental Health )        2/19/2025     8:25 AM   Additional Questions   Roomed by Ashlee ALANIS CMA   Accompanied by SPENSER       Video Start Time:  11:00    History of Present Illness       Mental Health Follow-up:  Patient presents to follow-up on Depression & Anxiety.Patient's depression since last visit has been:  Better  The patient is not having other symptoms associated with depression.  Patient's anxiety since last visit has been:  Better  The patient is not having other symptoms associated with anxiety.  Any significant life events: No  Patient is not feeling anxious or having panic attacks.  Patient has no concerns about alcohol or drug use.    She eats 2-3 servings of fruits and vegetables daily.She consumes 0 sweetened beverage(s) daily.She exercises with enough effort to increase her heart rate 10 to 19 minutes per day.  She exercises with enough effort to increase her heart rate 3 or less days per week.   She is taking medications regularly.  Patient is in treatment for chem dep.she will be done this coming Friday Buspar is causing side effects and wants to stop.  It is not helping anxiety. She will be going to outpatient therapy        Physical Exam   GENERAL: alert and no distress  EYES: Eyes grossly normal to inspection.  No discharge or erythema, or obvious scleral/conjunctival abnormalities.  RESP: No audible wheeze, cough, or visible cyanosis.    SKIN: Visible skin clear. No significant rash, abnormal pigmentation or lesions.  NEURO: Cranial nerves grossly intact.  Mentation  and speech appropriate for age.  PSYCH: Appropriate affect, tone, and pace of words           Video-Visit Details    Type of service:  Video Visit   Video End Time:11:21 AM  Originating Location (pt. Location): Other chem dep treatment    Distant Location (provider location):  On-site  Platform used for Video Visit: Meaghan  Signed Electronically by: Junior Carne MD

## 2025-04-02 NOTE — LETTER
April 2, 2025      Dontae Weston  [address] [place] [facility] [address]        To Whom It May Concern:    Dontae Weston was seen in our clinic today by a video visit.  She was unable to work 2/5-2/24/2025 because of mental health/chemical dependency.        Sincerely,        Junior Crane MD

## 2025-08-21 ENCOUNTER — PATIENT OUTREACH (OUTPATIENT)
Dept: CARE COORDINATION | Facility: CLINIC | Age: 50
End: 2025-08-21
Payer: COMMERCIAL

## 2025-09-04 ENCOUNTER — PATIENT OUTREACH (OUTPATIENT)
Dept: CARE COORDINATION | Facility: CLINIC | Age: 50
End: 2025-09-04
Payer: COMMERCIAL